# Patient Record
Sex: FEMALE | Race: WHITE | NOT HISPANIC OR LATINO | Employment: OTHER | URBAN - METROPOLITAN AREA
[De-identification: names, ages, dates, MRNs, and addresses within clinical notes are randomized per-mention and may not be internally consistent; named-entity substitution may affect disease eponyms.]

---

## 2023-03-22 DIAGNOSIS — E78.5 HYPERLIPIDEMIA, UNSPECIFIED: ICD-10-CM

## 2023-03-22 DIAGNOSIS — I10 ESSENTIAL (PRIMARY) HYPERTENSION: ICD-10-CM

## 2023-03-22 DIAGNOSIS — E11.9 TYPE 2 DIABETES MELLITUS WITHOUT COMPLICATIONS (MULTI): ICD-10-CM

## 2023-03-22 RX ORDER — METFORMIN HYDROCHLORIDE 500 MG/1
500 TABLET ORAL 2 TIMES DAILY
COMMUNITY
Start: 2015-11-12 | End: 2023-07-11 | Stop reason: ALTCHOICE

## 2023-03-22 RX ORDER — GABAPENTIN 600 MG/1
600 TABLET ORAL 3 TIMES DAILY
COMMUNITY
Start: 2022-06-21 | End: 2023-07-11 | Stop reason: ALTCHOICE

## 2023-03-22 RX ORDER — ATORVASTATIN CALCIUM 40 MG/1
TABLET, FILM COATED ORAL
Qty: 90 TABLET | Refills: 1 | Status: SHIPPED | OUTPATIENT
Start: 2023-03-22 | End: 2023-07-11 | Stop reason: SDUPTHER

## 2023-03-22 RX ORDER — METFORMIN HYDROCHLORIDE 500 MG/1
TABLET ORAL
Qty: 180 TABLET | Refills: 1 | Status: SHIPPED | OUTPATIENT
Start: 2023-03-22 | End: 2023-07-11 | Stop reason: SDUPTHER

## 2023-03-22 RX ORDER — BACLOFEN 10 MG/1
10 TABLET ORAL 3 TIMES DAILY
COMMUNITY
Start: 2022-08-23 | End: 2023-12-13

## 2023-03-22 RX ORDER — LISINOPRIL AND HYDROCHLOROTHIAZIDE 12.5; 2 MG/1; MG/1
TABLET ORAL
Qty: 90 TABLET | Refills: 1 | Status: SHIPPED | OUTPATIENT
Start: 2023-03-22 | End: 2023-07-11 | Stop reason: SDUPTHER

## 2023-03-22 RX ORDER — LAMOTRIGINE 100 MG/1
150 TABLET ORAL 2 TIMES DAILY
COMMUNITY
Start: 2022-05-19 | End: 2023-12-13

## 2023-03-22 RX ORDER — CARBAMAZEPINE 200 MG/1
200 TABLET ORAL 2 TIMES DAILY
COMMUNITY
Start: 2022-05-08 | End: 2023-07-11 | Stop reason: ALTCHOICE

## 2023-03-22 RX ORDER — OXYCODONE AND ACETAMINOPHEN 5; 325 MG/1; MG/1
1 TABLET ORAL EVERY 6 HOURS PRN
COMMUNITY
Start: 2023-02-24 | End: 2023-07-11 | Stop reason: ALTCHOICE

## 2023-03-22 RX ORDER — LISINOPRIL AND HYDROCHLOROTHIAZIDE 10; 12.5 MG/1; MG/1
1 TABLET ORAL DAILY
COMMUNITY
End: 2023-07-11 | Stop reason: ALTCHOICE

## 2023-03-22 RX ORDER — ATORVASTATIN CALCIUM 40 MG/1
40 TABLET, FILM COATED ORAL DAILY
COMMUNITY
Start: 2016-11-04 | End: 2023-07-11 | Stop reason: ALTCHOICE

## 2023-03-22 RX ORDER — ESOMEPRAZOLE MAGNESIUM 40 MG/1
40 CAPSULE, DELAYED RELEASE ORAL
COMMUNITY
End: 2023-07-11 | Stop reason: ALTCHOICE

## 2023-03-22 RX ORDER — DEXAMETHASONE 2 MG/1
2 TABLET ORAL DAILY
COMMUNITY
Start: 2022-08-23 | End: 2023-07-11 | Stop reason: ALTCHOICE

## 2023-07-10 PROBLEM — H10.10 ALLERGIC CONJUNCTIVITIS: Status: ACTIVE | Noted: 2023-07-10

## 2023-07-10 PROBLEM — K81.0 CHOLECYSTITIS, ACUTE: Status: ACTIVE | Noted: 2023-07-10

## 2023-07-10 PROBLEM — J12.82 PNEUMONIA DUE TO CORONAVIRUS DISEASE 2019: Status: ACTIVE | Noted: 2023-01-29

## 2023-07-10 PROBLEM — Z12.39 SCREENING FOR MALIGNANT NEOPLASM OF BREAST: Status: ACTIVE | Noted: 2022-10-13

## 2023-07-10 PROBLEM — H52.4 PRESBYOPIA: Status: ACTIVE | Noted: 2023-07-10

## 2023-07-10 PROBLEM — R09.81 NASAL CONGESTION: Status: ACTIVE | Noted: 2023-07-10

## 2023-07-10 PROBLEM — J06.9 ACUTE UPPER RESPIRATORY INFECTION: Status: ACTIVE | Noted: 2023-07-10

## 2023-07-10 PROBLEM — R00.0 TACHYCARDIA, UNSPECIFIED: Status: ACTIVE | Noted: 2023-07-10

## 2023-07-10 PROBLEM — A41.9 SEPSIS, UNSPECIFIED ORGANISM (MULTI): Status: ACTIVE | Noted: 2022-12-30

## 2023-07-10 PROBLEM — Z86.79 HISTORY OF HYPERTENSION: Status: ACTIVE | Noted: 2023-07-10

## 2023-07-10 PROBLEM — F45.42 PAIN DISORDER ASSOCIATED WITH PSYCHOLOGICAL AND PHYSICAL FACTORS: Status: ACTIVE | Noted: 2023-07-10

## 2023-07-10 PROBLEM — E78.5 DYSLIPIDEMIA: Status: ACTIVE | Noted: 2023-07-10

## 2023-07-10 PROBLEM — R05.9 COUGH: Status: ACTIVE | Noted: 2023-07-10

## 2023-07-10 PROBLEM — Z78.0 POSTMENOPAUSAL STATE: Status: ACTIVE | Noted: 2023-07-10

## 2023-07-10 PROBLEM — H25.10 NUCLEAR SENILE CATARACT: Status: ACTIVE | Noted: 2020-01-20

## 2023-07-10 PROBLEM — N84.0 ENDOMETRIAL POLYP: Status: ACTIVE | Noted: 2023-07-10

## 2023-07-10 PROBLEM — Z20.822 CONTACT WITH AND (SUSPECTED) EXPOSURE TO COVID-19: Status: ACTIVE | Noted: 2023-01-03

## 2023-07-10 PROBLEM — K80.00 CALCULUS OF GALLBLADDER WITH ACUTE CHOLECYSTITIS WITHOUT OBSTRUCTION: Status: ACTIVE | Noted: 2023-07-10

## 2023-07-10 PROBLEM — E66.01 MORBID OBESITY (MULTI): Status: ACTIVE | Noted: 2023-07-10

## 2023-07-10 PROBLEM — G50.9 TRIGEMINAL NERVE DISORDER: Status: ACTIVE | Noted: 2022-11-04

## 2023-07-10 PROBLEM — G50.9 TRIGEMINAL NEUROPATHY: Status: ACTIVE | Noted: 2023-07-10

## 2023-07-10 PROBLEM — J06.9 ACUTE URI: Status: ACTIVE | Noted: 2023-07-10

## 2023-07-10 PROBLEM — Z86.39 HISTORY OF DIABETES MELLITUS: Status: ACTIVE | Noted: 2023-07-10

## 2023-07-10 PROBLEM — D32.9 MENINGIOMA (MULTI): Status: ACTIVE | Noted: 2023-07-10

## 2023-07-10 PROBLEM — N95.0 POSTMENOPAUSAL BLEEDING: Status: ACTIVE | Noted: 2023-07-10

## 2023-07-10 PROBLEM — K82.2 PERFORATION OF GALLBLADDER: Status: ACTIVE | Noted: 2023-01-03

## 2023-07-10 PROBLEM — H52.209 ASTIGMATISM: Status: ACTIVE | Noted: 2023-07-10

## 2023-07-10 PROBLEM — S37.009A INJURY OF KIDNEY: Status: ACTIVE | Noted: 2023-01-03

## 2023-07-10 PROBLEM — A41.9 SEPTICEMIA (MULTI): Status: ACTIVE | Noted: 2023-07-10

## 2023-07-10 PROBLEM — E83.52 HYPERCALCEMIA: Status: ACTIVE | Noted: 2023-07-10

## 2023-07-10 PROBLEM — E86.0 DEHYDRATION: Status: ACTIVE | Noted: 2023-01-03

## 2023-07-10 PROBLEM — K75.0 ABSCESS OF LIVER (HHS-HCC): Status: ACTIVE | Noted: 2023-01-29

## 2023-07-10 PROBLEM — U07.1 PNEUMONIA DUE TO CORONAVIRUS DISEASE 2019: Status: ACTIVE | Noted: 2023-01-29

## 2023-07-10 PROBLEM — M79.2 NEURALGIA: Status: ACTIVE | Noted: 2023-07-10

## 2023-07-10 PROBLEM — G43.909 MIGRAINE HEADACHE: Status: ACTIVE | Noted: 2023-07-10

## 2023-07-10 PROBLEM — J18.9 PNEUMONIA DUE TO INFECTIOUS ORGANISM: Status: ACTIVE | Noted: 2023-07-10

## 2023-07-10 PROBLEM — I10 HYPERTENSION: Status: ACTIVE | Noted: 2023-02-17

## 2023-07-10 PROBLEM — U07.1 POSITIVE SELF-ADMINISTERED ANTIGEN TEST FOR COVID-19: Status: ACTIVE | Noted: 2023-07-10

## 2023-07-10 PROBLEM — L82.1 SEBORRHEIC KERATOSIS: Status: ACTIVE | Noted: 2023-07-10

## 2023-07-10 PROBLEM — N84.0 POLYP OF CORPUS UTERI: Status: ACTIVE | Noted: 2023-07-10

## 2023-07-10 PROBLEM — H10.13 ALLERGIC CONJUNCTIVITIS OF BOTH EYES: Status: ACTIVE | Noted: 2023-07-10

## 2023-07-10 PROBLEM — U07.1 DISEASE DUE TO SEVERE ACUTE RESPIRATORY SYNDROME CORONAVIRUS 2 (SARS-COV-2): Status: ACTIVE | Noted: 2023-01-28

## 2023-07-10 PROBLEM — I25.10 ARTERIOSCLEROSIS OF CORONARY ARTERY: Status: ACTIVE | Noted: 2023-02-14

## 2023-07-10 PROBLEM — G43.909 MIGRAINES: Status: ACTIVE | Noted: 2023-07-10

## 2023-07-10 PROBLEM — R74.8 HIGH LEVEL OF CARDIAC MARKER: Status: ACTIVE | Noted: 2023-07-10

## 2023-07-10 PROBLEM — I95.9 HYPOTENSION: Status: ACTIVE | Noted: 2023-07-10

## 2023-07-10 PROBLEM — Z20.822 SUSPECTED SEVERE ACUTE RESPIRATORY SYNDROME CORONAVIRUS 2 (SARS-COV-2) INFECTION: Status: ACTIVE | Noted: 2023-07-10

## 2023-07-10 PROBLEM — H43.812 PVD (POSTERIOR VITREOUS DETACHMENT), LEFT EYE: Status: ACTIVE | Noted: 2023-07-10

## 2023-07-10 PROBLEM — L57.0 SENILE HYPERKERATOSIS: Status: ACTIVE | Noted: 2023-07-10

## 2023-07-10 PROBLEM — M25.569 KNEE PAIN: Status: ACTIVE | Noted: 2023-07-10

## 2023-07-10 PROBLEM — H02.839 DERMATOCHALASIS: Status: ACTIVE | Noted: 2023-07-10

## 2023-07-10 PROBLEM — G47.33 OBSTRUCTIVE SLEEP APNEA SYNDROME: Status: ACTIVE | Noted: 2023-02-24

## 2023-07-10 PROBLEM — Z86.39 HISTORY OF ELEVATED LIPIDS: Status: ACTIVE | Noted: 2023-07-10
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
## 2023-07-10 PROBLEM — D49.7 NEOPLASM OF MENINGES: Status: ACTIVE | Noted: 2022-09-08

## 2023-07-10 PROBLEM — G50.0 TRIGEMINAL NEURALGIA: Status: ACTIVE | Noted: 2023-07-10

## 2023-07-10 PROBLEM — Z86.16 PERSONAL HISTORY OF COVID-19: Status: ACTIVE | Noted: 2023-02-24

## 2023-07-10 PROBLEM — R51.9 HEADACHE: Status: ACTIVE | Noted: 2023-07-10

## 2023-07-10 PROBLEM — J30.9 ALLERGIC RHINITIS: Status: ACTIVE | Noted: 2023-07-10

## 2023-07-10 PROBLEM — E11.9 CONTROLLED DIABETES MELLITUS TYPE II WITHOUT COMPLICATION (MULTI): Status: ACTIVE | Noted: 2023-02-24

## 2023-07-10 PROBLEM — H25.813 COMBINED FORM OF AGE-RELATED CATARACT, BOTH EYES: Status: ACTIVE | Noted: 2023-07-10

## 2023-07-10 PROBLEM — L71.9 ROSACEA: Status: ACTIVE | Noted: 2023-07-10

## 2023-07-10 PROBLEM — E66.01 MORBID (SEVERE) OBESITY DUE TO EXCESS CALORIES (MULTI): Status: ACTIVE | Noted: 2023-07-10

## 2023-07-10 RX ORDER — ALBUTEROL SULFATE 90 UG/1
AEROSOL, METERED RESPIRATORY (INHALATION)
COMMUNITY
Start: 2014-12-11 | End: 2023-07-11 | Stop reason: ALTCHOICE

## 2023-07-10 RX ORDER — OMEGA-3-ACID ETHYL ESTERS 1 G/1
CAPSULE, LIQUID FILLED ORAL
COMMUNITY
End: 2023-07-11 | Stop reason: ALTCHOICE

## 2023-07-10 RX ORDER — CELECOXIB 200 MG/1
200 CAPSULE ORAL
COMMUNITY
Start: 2012-10-22 | End: 2023-07-11 | Stop reason: ALTCHOICE

## 2023-07-10 RX ORDER — INDOMETHACIN 25 MG/1
CAPSULE ORAL
COMMUNITY
Start: 2019-08-20 | End: 2023-07-11 | Stop reason: ALTCHOICE

## 2023-07-10 RX ORDER — ASPIRIN 81 MG/1
TABLET ORAL
COMMUNITY

## 2023-07-10 RX ORDER — PANTOPRAZOLE SODIUM 40 MG/1
TABLET, DELAYED RELEASE ORAL
COMMUNITY
End: 2023-07-11 | Stop reason: ALTCHOICE

## 2023-07-10 RX ORDER — METRONIDAZOLE 7.5 MG/G
GEL TOPICAL EVERY 12 HOURS
COMMUNITY
Start: 2020-10-12 | End: 2023-07-11 | Stop reason: ALTCHOICE

## 2023-07-11 ENCOUNTER — LAB (OUTPATIENT)
Dept: LAB | Facility: LAB | Age: 78
End: 2023-07-11
Payer: MEDICARE

## 2023-07-11 ENCOUNTER — APPOINTMENT (OUTPATIENT)
Dept: LAB | Facility: LAB | Age: 78
End: 2023-07-11
Payer: MEDICARE

## 2023-07-11 ENCOUNTER — OFFICE VISIT (OUTPATIENT)
Dept: PRIMARY CARE | Facility: CLINIC | Age: 78
End: 2023-07-11
Payer: MEDICARE

## 2023-07-11 VITALS
HEIGHT: 67 IN | TEMPERATURE: 98 F | DIASTOLIC BLOOD PRESSURE: 66 MMHG | OXYGEN SATURATION: 95 % | SYSTOLIC BLOOD PRESSURE: 109 MMHG | WEIGHT: 282 LBS | HEART RATE: 82 BPM | BODY MASS INDEX: 44.26 KG/M2

## 2023-07-11 DIAGNOSIS — E78.5 DYSLIPIDEMIA: ICD-10-CM

## 2023-07-11 DIAGNOSIS — I10 PRIMARY HYPERTENSION: ICD-10-CM

## 2023-07-11 DIAGNOSIS — E11.9 TYPE 2 DIABETES MELLITUS WITHOUT COMPLICATIONS (MULTI): ICD-10-CM

## 2023-07-11 DIAGNOSIS — E11.9 TYPE 2 DIABETES MELLITUS WITHOUT COMPLICATION, WITHOUT LONG-TERM CURRENT USE OF INSULIN (MULTI): ICD-10-CM

## 2023-07-11 DIAGNOSIS — Z12.31 ENCOUNTER FOR SCREENING MAMMOGRAM FOR MALIGNANT NEOPLASM OF BREAST: ICD-10-CM

## 2023-07-11 DIAGNOSIS — Z00.00 MEDICARE ANNUAL WELLNESS VISIT, SUBSEQUENT: Primary | ICD-10-CM

## 2023-07-11 DIAGNOSIS — I10 ESSENTIAL (PRIMARY) HYPERTENSION: ICD-10-CM

## 2023-07-11 DIAGNOSIS — Z12.83 SKIN CANCER SCREENING: ICD-10-CM

## 2023-07-11 DIAGNOSIS — Z12.11 COLON CANCER SCREENING: ICD-10-CM

## 2023-07-11 DIAGNOSIS — E78.5 HYPERLIPIDEMIA, UNSPECIFIED: ICD-10-CM

## 2023-07-11 PROBLEM — H10.10 ALLERGIC CONJUNCTIVITIS: Status: RESOLVED | Noted: 2023-07-10 | Resolved: 2023-07-11

## 2023-07-11 PROBLEM — L57.0 SENILE HYPERKERATOSIS: Status: RESOLVED | Noted: 2023-07-10 | Resolved: 2023-07-11

## 2023-07-11 PROBLEM — Z20.822 SUSPECTED SEVERE ACUTE RESPIRATORY SYNDROME CORONAVIRUS 2 (SARS-COV-2) INFECTION: Status: RESOLVED | Noted: 2023-07-10 | Resolved: 2023-07-11

## 2023-07-11 PROBLEM — K82.2 PERFORATION OF GALLBLADDER: Status: RESOLVED | Noted: 2023-01-03 | Resolved: 2023-07-11

## 2023-07-11 PROBLEM — G50.9 TRIGEMINAL NEUROPATHY: Status: RESOLVED | Noted: 2023-07-10 | Resolved: 2023-07-11

## 2023-07-11 PROBLEM — U07.1 DISEASE DUE TO SEVERE ACUTE RESPIRATORY SYNDROME CORONAVIRUS 2 (SARS-COV-2): Status: RESOLVED | Noted: 2023-01-28 | Resolved: 2023-07-11

## 2023-07-11 PROBLEM — Z86.79 HISTORY OF HYPERTENSION: Status: RESOLVED | Noted: 2023-07-10 | Resolved: 2023-07-11

## 2023-07-11 PROBLEM — R09.81 NASAL CONGESTION: Status: RESOLVED | Noted: 2023-07-10 | Resolved: 2023-07-11

## 2023-07-11 PROBLEM — Z86.39 HISTORY OF DIABETES MELLITUS: Status: RESOLVED | Noted: 2023-07-10 | Resolved: 2023-07-11

## 2023-07-11 PROBLEM — J06.9 ACUTE URI: Status: RESOLVED | Noted: 2023-07-10 | Resolved: 2023-07-11

## 2023-07-11 PROBLEM — N84.0 POLYP OF CORPUS UTERI: Status: RESOLVED | Noted: 2023-07-10 | Resolved: 2023-07-11

## 2023-07-11 PROBLEM — Z86.39 HISTORY OF ELEVATED LIPIDS: Status: RESOLVED | Noted: 2023-07-10 | Resolved: 2023-07-11

## 2023-07-11 PROBLEM — G50.9 TRIGEMINAL NERVE DISORDER: Status: RESOLVED | Noted: 2022-11-04 | Resolved: 2023-07-11

## 2023-07-11 PROBLEM — A41.9 SEPSIS, UNSPECIFIED ORGANISM (MULTI): Status: RESOLVED | Noted: 2022-12-30 | Resolved: 2023-07-11

## 2023-07-11 PROBLEM — J30.9 ALLERGIC RHINITIS: Status: RESOLVED | Noted: 2023-07-10 | Resolved: 2023-07-11

## 2023-07-11 PROBLEM — R51.9 HEADACHE: Status: RESOLVED | Noted: 2023-07-10 | Resolved: 2023-07-11

## 2023-07-11 PROBLEM — A41.9 SEPTICEMIA (MULTI): Status: RESOLVED | Noted: 2023-07-10 | Resolved: 2023-07-11

## 2023-07-11 PROBLEM — J18.9 PNEUMONIA DUE TO INFECTIOUS ORGANISM: Status: RESOLVED | Noted: 2023-07-10 | Resolved: 2023-07-11

## 2023-07-11 PROBLEM — J12.82 PNEUMONIA DUE TO CORONAVIRUS DISEASE 2019: Status: RESOLVED | Noted: 2023-01-29 | Resolved: 2023-07-11

## 2023-07-11 PROBLEM — U07.1 POSITIVE SELF-ADMINISTERED ANTIGEN TEST FOR COVID-19: Status: RESOLVED | Noted: 2023-07-10 | Resolved: 2023-07-11

## 2023-07-11 PROBLEM — Z78.0 POSTMENOPAUSAL STATE: Status: RESOLVED | Noted: 2023-07-10 | Resolved: 2023-07-11

## 2023-07-11 PROBLEM — Z12.39 SCREENING FOR MALIGNANT NEOPLASM OF BREAST: Status: RESOLVED | Noted: 2022-10-13 | Resolved: 2023-07-11

## 2023-07-11 PROBLEM — J06.9 ACUTE UPPER RESPIRATORY INFECTION: Status: RESOLVED | Noted: 2023-07-10 | Resolved: 2023-07-11

## 2023-07-11 PROBLEM — Z20.822 CONTACT WITH AND (SUSPECTED) EXPOSURE TO COVID-19: Status: RESOLVED | Noted: 2023-01-03 | Resolved: 2023-07-11

## 2023-07-11 PROBLEM — H10.13 ALLERGIC CONJUNCTIVITIS OF BOTH EYES: Status: RESOLVED | Noted: 2023-07-10 | Resolved: 2023-07-11

## 2023-07-11 PROBLEM — U07.1 PNEUMONIA DUE TO CORONAVIRUS DISEASE 2019: Status: RESOLVED | Noted: 2023-01-29 | Resolved: 2023-07-11

## 2023-07-11 PROBLEM — K81.0 CHOLECYSTITIS, ACUTE: Status: RESOLVED | Noted: 2023-07-10 | Resolved: 2023-07-11

## 2023-07-11 PROBLEM — R00.0 TACHYCARDIA, UNSPECIFIED: Status: RESOLVED | Noted: 2023-07-10 | Resolved: 2023-07-11

## 2023-07-11 PROBLEM — M25.569 KNEE PAIN: Status: RESOLVED | Noted: 2023-07-10 | Resolved: 2023-07-11

## 2023-07-11 PROBLEM — F45.42 PAIN DISORDER ASSOCIATED WITH PSYCHOLOGICAL AND PHYSICAL FACTORS: Status: RESOLVED | Noted: 2023-07-10 | Resolved: 2023-07-11

## 2023-07-11 LAB
ALANINE AMINOTRANSFERASE (SGPT) (U/L) IN SER/PLAS: 13 U/L (ref 7–45)
ALBUMIN (G/DL) IN SER/PLAS: 4.6 G/DL (ref 3.4–5)
ALKALINE PHOSPHATASE (U/L) IN SER/PLAS: 98 U/L (ref 33–136)
ANION GAP IN SER/PLAS: 14 MMOL/L (ref 10–20)
ASPARTATE AMINOTRANSFERASE (SGOT) (U/L) IN SER/PLAS: 15 U/L (ref 9–39)
BILIRUBIN TOTAL (MG/DL) IN SER/PLAS: 0.5 MG/DL (ref 0–1.2)
CALCIUM (MG/DL) IN SER/PLAS: 10.8 MG/DL (ref 8.6–10.6)
CARBON DIOXIDE, TOTAL (MMOL/L) IN SER/PLAS: 30 MMOL/L (ref 21–32)
CHLORIDE (MMOL/L) IN SER/PLAS: 99 MMOL/L (ref 98–107)
CHOLESTEROL (MG/DL) IN SER/PLAS: 153 MG/DL (ref 0–199)
CHOLESTEROL IN HDL (MG/DL) IN SER/PLAS: 61.4 MG/DL
CHOLESTEROL/HDL RATIO: 2.5
CREATININE (MG/DL) IN SER/PLAS: 0.74 MG/DL (ref 0.5–1.05)
ERYTHROCYTE DISTRIBUTION WIDTH (RATIO) BY AUTOMATED COUNT: 14.5 % (ref 11.5–14.5)
ERYTHROCYTE MEAN CORPUSCULAR HEMOGLOBIN CONCENTRATION (G/DL) BY AUTOMATED: 31.1 G/DL (ref 32–36)
ERYTHROCYTE MEAN CORPUSCULAR VOLUME (FL) BY AUTOMATED COUNT: 92 FL (ref 80–100)
ERYTHROCYTES (10*6/UL) IN BLOOD BY AUTOMATED COUNT: 4.23 X10E12/L (ref 4–5.2)
ESTIMATED AVERAGE GLUCOSE FOR HBA1C: 140 MG/DL
GFR FEMALE: 84 ML/MIN/1.73M2
GLUCOSE (MG/DL) IN SER/PLAS: 108 MG/DL (ref 74–99)
HEMATOCRIT (%) IN BLOOD BY AUTOMATED COUNT: 38.9 % (ref 36–46)
HEMOGLOBIN (G/DL) IN BLOOD: 12.1 G/DL (ref 12–16)
HEMOGLOBIN A1C/HEMOGLOBIN TOTAL IN BLOOD: 6.5 %
LDL: 74 MG/DL (ref 0–99)
LEUKOCYTES (10*3/UL) IN BLOOD BY AUTOMATED COUNT: 4.6 X10E9/L (ref 4.4–11.3)
NRBC (PER 100 WBCS) BY AUTOMATED COUNT: 0 /100 WBC (ref 0–0)
PLATELETS (10*3/UL) IN BLOOD AUTOMATED COUNT: 301 X10E9/L (ref 150–450)
POTASSIUM (MMOL/L) IN SER/PLAS: 4.7 MMOL/L (ref 3.5–5.3)
PROTEIN TOTAL: 6.8 G/DL (ref 6.4–8.2)
SODIUM (MMOL/L) IN SER/PLAS: 138 MMOL/L (ref 136–145)
TRIGLYCERIDE (MG/DL) IN SER/PLAS: 88 MG/DL (ref 0–149)
UREA NITROGEN (MG/DL) IN SER/PLAS: 20 MG/DL (ref 6–23)
VLDL: 18 MG/DL (ref 0–40)

## 2023-07-11 PROCEDURE — 1159F MED LIST DOCD IN RCRD: CPT | Performed by: INTERNAL MEDICINE

## 2023-07-11 PROCEDURE — 80053 COMPREHEN METABOLIC PANEL: CPT

## 2023-07-11 PROCEDURE — 1170F FXNL STATUS ASSESSED: CPT | Performed by: INTERNAL MEDICINE

## 2023-07-11 PROCEDURE — 3078F DIAST BP <80 MM HG: CPT | Performed by: INTERNAL MEDICINE

## 2023-07-11 PROCEDURE — 83036 HEMOGLOBIN GLYCOSYLATED A1C: CPT

## 2023-07-11 PROCEDURE — 3074F SYST BP LT 130 MM HG: CPT | Performed by: INTERNAL MEDICINE

## 2023-07-11 PROCEDURE — 80061 LIPID PANEL: CPT

## 2023-07-11 PROCEDURE — 85027 COMPLETE CBC AUTOMATED: CPT

## 2023-07-11 PROCEDURE — 1036F TOBACCO NON-USER: CPT | Performed by: INTERNAL MEDICINE

## 2023-07-11 PROCEDURE — 36415 COLL VENOUS BLD VENIPUNCTURE: CPT

## 2023-07-11 PROCEDURE — 1126F AMNT PAIN NOTED NONE PRSNT: CPT | Performed by: INTERNAL MEDICINE

## 2023-07-11 PROCEDURE — G0439 PPPS, SUBSEQ VISIT: HCPCS | Performed by: INTERNAL MEDICINE

## 2023-07-11 RX ORDER — LISINOPRIL AND HYDROCHLOROTHIAZIDE 12.5; 2 MG/1; MG/1
1 TABLET ORAL DAILY
Qty: 90 TABLET | Refills: 1 | Status: SHIPPED | OUTPATIENT
Start: 2023-07-11 | End: 2024-01-22 | Stop reason: ALTCHOICE

## 2023-07-11 RX ORDER — GABAPENTIN 600 MG/1
600 TABLET ORAL 3 TIMES DAILY
COMMUNITY
End: 2023-11-09 | Stop reason: SDUPTHER

## 2023-07-11 RX ORDER — ATORVASTATIN CALCIUM 40 MG/1
40 TABLET, FILM COATED ORAL DAILY
Qty: 90 TABLET | Refills: 1 | Status: SHIPPED | OUTPATIENT
Start: 2023-07-11 | End: 2024-02-02

## 2023-07-11 RX ORDER — GABAPENTIN 300 MG/1
CAPSULE ORAL
COMMUNITY
Start: 2023-07-07 | End: 2023-07-11 | Stop reason: ALTCHOICE

## 2023-07-11 RX ORDER — METFORMIN HYDROCHLORIDE 500 MG/1
500 TABLET ORAL 2 TIMES DAILY
Qty: 180 TABLET | Refills: 1 | Status: SHIPPED | OUTPATIENT
Start: 2023-07-11 | End: 2024-02-02

## 2023-07-11 ASSESSMENT — PATIENT HEALTH QUESTIONNAIRE - PHQ9
2. FEELING DOWN, DEPRESSED OR HOPELESS: NOT AT ALL
1. LITTLE INTEREST OR PLEASURE IN DOING THINGS: NOT AT ALL
SUM OF ALL RESPONSES TO PHQ9 QUESTIONS 1 AND 2: 0

## 2023-07-11 ASSESSMENT — ACTIVITIES OF DAILY LIVING (ADL)
BATHING: INDEPENDENT
DRESSING: INDEPENDENT
DOING_HOUSEWORK: INDEPENDENT
GROCERY_SHOPPING: INDEPENDENT
MANAGING_FINANCES: INDEPENDENT
TAKING_MEDICATION: INDEPENDENT

## 2023-07-11 NOTE — PROGRESS NOTES
"SUBJECTIVE:   Amy Ruby is a 78 y.o. female presenting for her annual physical/wellness.  PCP: Christina Souza MD  Medicare wellness and routine Follow up.   She finally recovered from her ruptured cholecystitis, and surgeries. Was not able to do much physical activities, gained some weight. Is back to doing garden work.   No other concerns.    Colon screening:  due. Last cologuard box got flooded.  Last pap: na  Last mammogram: due in 10/2023   Vaccines Up to date.    Diet:   healthy in general  Exercises:  mild exercises regularly  Lab Results   Component Value Date    HGBA1C 6.7 (A) 09/07/2022     Lab Results   Component Value Date    CREATININE 0.97 01/27/2023     Lab Results   Component Value Date    WBC 5.7 01/27/2023    HGB 12.0 01/27/2023    HCT 38.5 01/27/2023    MCV 90 01/27/2023     01/27/2023     Lab Results   Component Value Date    CHOL 192 09/07/2022    CHOL 195 04/27/2021    CHOL 162 03/04/2020     Lab Results   Component Value Date    HDL 69.6 09/07/2022    HDL 60.2 04/27/2021    HDL 53.3 03/04/2020     No results found for: \"LDLCALC\"  Lab Results   Component Value Date    TRIG 125 09/07/2022    TRIG 176 (H) 04/27/2021    TRIG 166 (H) 03/04/2020     No components found for: \"CHOLHDL\"       ROS:   Feeling well. No dyspnea or chest pain on exertion. No abdominal pain, change in bowel habits, black or bloody stools. No urinary tract or  symptoms.  No breast concerns. No neurological complaints.    OBJECTIVE:   The patient appears well, alert, oriented x 3, in no distress.   /66   Pulse 82   Temp 36.7 °C (98 °F)   Ht 1.689 m (5' 6.5\")   Wt 128 kg (282 lb)   SpO2 95%   BMI 44.83 kg/m²   ENT normal.  Neck supple. No adenopathy or thyromegaly. MARIO ALBERTO. Lungs are clear, good air entry, no wheezes, rhonchi or rales. S1 and S2 normal, no murmurs, regular rate and rhythm. Abdomen is soft without tenderness, guarding, mass or organomegaly.  exam deferred to Gyn. Extremities show mild " ankle edema, normal peripheral pulses. Neurological is normal without focal findings.    A/P:  1. Medicare annual wellness visit, subsequent       2. Dyslipidemia       3. Primary hypertension     - CBC; Future  - Comprehensive Metabolic Panel; Future  - Lipid Panel; Future    4. Colon cancer screening     - Cologuard® colon cancer screening; Future  - Cologuard® colon cancer screening    5. Encounter for screening mammogram for malignant neoplasm of breast     - BI mammo bilateral screening tomosynthesis; Future    6. Type 2 diabetes mellitus without complication, without long-term current use of insulin (CMS/HCC)     - Comprehensive Metabolic Panel; Future  - Lipid Panel; Future  - Hemoglobin A1C; Future    7. Skin cancer screening     - Referral to Dermatology; Future    8. Type 2 diabetes mellitus without complications (CMS/HCC)      - metFORMIN (Glucophage) 500 mg tablet; Take 1 tablet (500 mg) by mouth 2 times a day.  Dispense: 180 tablet; Refill: 1    9. Essential (primary) hypertension     - lisinopriL-hydrochlorothiazide 20-12.5 mg tablet; Take 1 tablet by mouth once daily.  Dispense: 90 tablet; Refill: 1    10. Hyperlipidemia, unspecified     - atorvastatin (Lipitor) 40 mg tablet; Take 1 tablet (40 mg) by mouth once daily.  Dispense: 90 tablet; Refill: 1    PLAN:   continue current medications    Follow up: 6 months

## 2023-07-12 DIAGNOSIS — E83.52 HYPERCALCEMIA: Primary | ICD-10-CM

## 2023-08-02 ENCOUNTER — LAB (OUTPATIENT)
Dept: LAB | Facility: LAB | Age: 78
End: 2023-08-02
Payer: MEDICARE

## 2023-08-02 DIAGNOSIS — E83.52 HYPERCALCEMIA: ICD-10-CM

## 2023-08-02 LAB
CALCIDIOL (25 OH VITAMIN D3) (NG/ML) IN SER/PLAS: 12 NG/ML
CALCIUM (MG/DL) IN SER/PLAS: 10.9 MG/DL (ref 8.6–10.6)

## 2023-08-02 PROCEDURE — 82306 VITAMIN D 25 HYDROXY: CPT

## 2023-08-02 PROCEDURE — 36415 COLL VENOUS BLD VENIPUNCTURE: CPT

## 2023-08-02 PROCEDURE — 82310 ASSAY OF CALCIUM: CPT

## 2023-08-30 PROBLEM — I95.9 HYPOTENSION: Status: ACTIVE | Noted: 2023-08-30

## 2023-08-30 PROBLEM — E66.9 OBESITY, UNSPECIFIED: Status: ACTIVE | Noted: 2023-01-03

## 2023-08-30 PROBLEM — M62.82 RHABDOMYOLYSIS: Status: ACTIVE | Noted: 2023-01-03

## 2023-08-30 PROBLEM — Z79.899 OTHER LONG TERM (CURRENT) DRUG THERAPY: Status: ACTIVE | Noted: 2023-01-29

## 2023-08-30 PROBLEM — Z90.49 ACQUIRED ABSENCE OF OTHER SPECIFIED PARTS OF DIGESTIVE TRACT: Status: ACTIVE | Noted: 2023-02-03

## 2023-08-30 PROBLEM — E78.5 HYPERLIPIDEMIA, UNSPECIFIED: Status: ACTIVE | Noted: 2023-01-03

## 2023-08-30 PROBLEM — Z79.84 LONG TERM (CURRENT) USE OF ORAL HYPOGLYCEMIC DRUGS: Status: ACTIVE | Noted: 2023-01-29

## 2023-08-30 PROBLEM — M62.81 MUSCLE WEAKNESS (GENERALIZED): Status: ACTIVE | Noted: 2023-01-03

## 2023-08-30 PROBLEM — I10 ESSENTIAL (PRIMARY) HYPERTENSION: Status: ACTIVE | Noted: 2023-01-03

## 2023-08-30 PROBLEM — E11.9 TYPE 2 DIABETES MELLITUS WITHOUT COMPLICATION (MULTI): Status: ACTIVE | Noted: 2023-01-03

## 2023-08-30 PROBLEM — K21.9 GASTRO-ESOPHAGEAL REFLUX DISEASE WITHOUT ESOPHAGITIS: Status: ACTIVE | Noted: 2023-01-03

## 2023-08-30 PROBLEM — R19.5 OTHER FECAL ABNORMALITIES: Status: ACTIVE | Noted: 2023-02-03

## 2023-08-30 PROBLEM — N17.9 ACUTE KIDNEY FAILURE, UNSPECIFIED (CMS-HCC): Status: ACTIVE | Noted: 2023-01-03

## 2023-08-30 PROBLEM — Z86.011 PERSONAL HISTORY OF BENIGN NEOPLASM OF THE BRAIN: Status: ACTIVE | Noted: 2023-01-03
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
## 2023-08-30 RX ORDER — GABAPENTIN 300 MG/1
3 CAPSULE ORAL 3 TIMES DAILY
COMMUNITY
Start: 2023-07-07 | End: 2023-10-12

## 2023-10-06 ENCOUNTER — OFFICE VISIT (OUTPATIENT)
Dept: NEUROSURGERY | Facility: CLINIC | Age: 78
End: 2023-10-06
Payer: MEDICARE

## 2023-10-06 VITALS
WEIGHT: 182 LBS | SYSTOLIC BLOOD PRESSURE: 143 MMHG | HEIGHT: 67 IN | HEART RATE: 98 BPM | TEMPERATURE: 96.4 F | BODY MASS INDEX: 28.56 KG/M2 | DIASTOLIC BLOOD PRESSURE: 78 MMHG

## 2023-10-06 DIAGNOSIS — G50.9 TRIGEMINAL NEUROPATHY: Primary | ICD-10-CM

## 2023-10-06 DIAGNOSIS — D32.9 MENINGIOMA (MULTI): ICD-10-CM

## 2023-10-06 PROCEDURE — 1036F TOBACCO NON-USER: CPT | Performed by: NEUROLOGICAL SURGERY

## 2023-10-06 PROCEDURE — 1125F AMNT PAIN NOTED PAIN PRSNT: CPT | Performed by: NEUROLOGICAL SURGERY

## 2023-10-06 PROCEDURE — 99213 OFFICE O/P EST LOW 20 MIN: CPT | Performed by: NEUROLOGICAL SURGERY

## 2023-10-06 PROCEDURE — 3077F SYST BP >= 140 MM HG: CPT | Performed by: NEUROLOGICAL SURGERY

## 2023-10-06 PROCEDURE — 1159F MED LIST DOCD IN RCRD: CPT | Performed by: NEUROLOGICAL SURGERY

## 2023-10-06 PROCEDURE — 3078F DIAST BP <80 MM HG: CPT | Performed by: NEUROLOGICAL SURGERY

## 2023-10-06 ASSESSMENT — PAIN SCALES - GENERAL: PAINLEVEL: 2

## 2023-10-06 NOTE — PROGRESS NOTES
NEUROSURGERY EVALUATION NOTE    Diagnosis  1. Trigeminal neuropathy        2. Meningioma (CMS/HCC)            Patient Discussion/Summary  1) Today we discussed continuing with the medications and conservative management as tolerated.   2) Ms. Ruby can follow-up with me after the holidays and we can reassess her pain at that time.   3) If Ms. Ruby's symptoms worsen and she would like us to give her a referral to Dr. Serna from gamma knife radiosurgery, then she can call our office to request that at any time.     Provider Impression  78 y.o. female with L TN meningioma in [place] producing L trigeminal neuropathic pain, V3> V2 >>V1, but no loss of sensation, poss dysesthesia; pt on numerous neuropathic agents, managed by Dr. De León with some relief; she is s/p GKS with Dr. Littlejohn in 2020 to [place] with 3-4 mo resolution of sx. Pain is triggerable like TN, but also with a baseline constant component. During last OV on 7/7/23 we again discussed surgical options of repeat GKS to tumor, vs GKS or RF to TN (to treat as though trigeminal neuralgia given sx are similar to trigeminal neuralgia) vs treating for trigeminal neuropathic pain via R MCS (less optimal bc unable to get MRI). Currently, pt is uninterested in revisiting any of the above bc she feels her sx are manageable. She plans to continue playing with medications with Dr. De León as needed and would like to see me again after the holidays to revisit her sx in case her pain changes. We discussed getting her a referral to Dr. Serna from radiation oncology while she is doing well, but she wishes to hold off on that as well.     History of Present Illness  Chief Complaint:   Chief Complaint   Patient presents with    Follow-up     Left side facial pain       HPI: Amy Ruby is a 78 y.o. female with L [place] meningioma in [place] producing L trigeminal neuropathic pain, V3> V2 >>V1, but no loss of sensation, poss dysesthesia; pt on numerous  neuropathic agents with some relief and s/p GKS with Dr. Littlejohn in 2020 to [place] [facility] with 3-4 mo resolution of sx. Pain is triggerable like TN, but also with a baseline constant component. During last OV on 7/7/23 we again discussed surgical options of repeat GKS to tumor, vs GKS or RF to TN (to treat as though trigeminal neuralgia given sx are similar to trigeminal neuralgia) vs treating for trigeminal neuropathic pain via R MCS (less optimal bc unable to get MRI). However, repeat GKS to tumor is suboptimal given no tumor growth on last imaging and proximity to cranial nerves; GKS to cisternal segment of nerve is also less desirable given risk of producing deafferentation pain since this is not true TN. As such, we also discussed MCS on the right as a surgical option to help control the pain, and we would try to follow tumor with CT subsequently.     Patient reports to be doing okay since the last visit, but her left facial pain has continued. Patient reports that previously when she took her medications she would have pain free intervals, but now has consistent pain, however, she feels it is manageable and is still not interested in proceeding with surgery or GKS at this time. Patient reports occas sensation of hyperesthesia right around the bottom of the eye and around the nose, which happens several times a day. No numbness. Patient denies new pains. In the interim, patient completed a repeat brain MRI, which reported stable meningioma. Patient has also continued to follow with Dr. De León and had her lamictal dosing increased, which she is currently titrating up. Patient reports that if this is not helpful, they will consider other medications. Patient continues to take a combination of gabapentin, lamictal, and baclofen, with suboptimal relief of L facial pain. Patient presents today for follow up and to further discuss surgical options.     ROS: A complete 11 system ROS was performed (constitutional,  eyes, ENT, cardiovascular, respiratory, GI, , musculoskeletal, skin, neurological, and psychiatric) and was negative aside from the pertinent positives and negatives noted in the HPI.    Previous History  Past Medical History:   Diagnosis Date    Age-related nuclear cataract, left eye 01/20/2020    Age-related nuclear cataract, left    Age-related nuclear cataract, right eye 01/20/2020    Age-related nuclear cataract, right    Cholecystitis, acute 07/10/2023    Cortical age-related cataract, left eye 01/20/2020    Cortical age-related cataract of left eye    Cortical age-related cataract, right eye 01/20/2020    Cortical age-related cataract of right eye    Encounter for general adult medical examination without abnormal findings 09/07/2022    Medicare annual wellness visit, subsequent    Pain in right knee 11/12/2015    Knee pain, bilateral    Perforation of gallbladder 01/03/2023    Personal history of other diseases of the circulatory system     History of essential hypertension    Personal history of other diseases of the circulatory system 03/06/2017    History of abnormal electrocardiography    Personal history of other diseases of the respiratory system     History of bronchitis    Personal history of other endocrine, nutritional and metabolic disease     History of hyperlipidemia    Personal history of other endocrine, nutritional and metabolic disease     History of diabetes mellitus     Past Surgical History:   Procedure Laterality Date    CHOLECYSTECTOMY      KNEE SURGERY  02/16/2017    Knee Surgery Right    MR HEAD ANGIO WO IV CONTRAST  03/07/2019    MR HEAD ANGIO WO IV CONTRAST [place] [facility]    MR NECK ANGIO WO IV CONTRAST  03/07/2019    MR NECK ANGIO WO IV CONTRAST [place] [facility]    OTHER SURGICAL HISTORY  03/06/2017    Arterial Catheterization    OTHER SURGICAL HISTORY  05/08/2017    Hysteroscopy    TONSILLECTOMY  02/16/2017    Tonsillectomy     Social History      Tobacco Use    Smoking status: Never    Smokeless tobacco: Never   Substance Use Topics    Alcohol use: Never    Drug use: Never     Family History   Problem Relation Name Age of Onset    Dementia Mother          Alzheimer's    Hypertension Mother      Other (Cardiac Disorder) Mother      Seizures Father      Diabetes Father      Prostate cancer Father      Hypertension Sister      Diabetes Sister      Hypertension Brother      Diabetes Brother       Allergies   Allergen Reactions    Other Other     Current Outpatient Medications   Medication Instructions    aspirin 81 mg EC tablet oral, Daily RT    atorvastatin (LIPITOR) 40 mg, oral, Daily    baclofen (LIORESAL) 10 mg, oral, 3 times daily    gabapentin (Neurontin) 300 mg capsule 3 capsules, oral, 3 times daily, TITRATE AS DIRECTED.    gabapentin (NEURONTIN) 600 mg, oral, 3 times daily    lamoTRIgine (LAMICTAL) 150 mg, oral, 2 times daily    lisinopriL-hydrochlorothiazide 20-12.5 mg tablet 1 tablet, oral, Daily    metFORMIN (GLUCOPHAGE) 500 mg, oral, 2 times daily       Physical Exam  Well appearing, no acute distress. Cranial nerves 2-12 intact. No dysethesias of face today. Strengths equal throughout.     Results      Time  Prep Time On Date of the Patient Encounter:    Minutes  Time Directly with Patient/Family/Caregiver:    Minutes  Additional Time Spent on Patient Care Activities:   Minutes  Documentation Time:      Minutes  Other Time Spent:      Minutes    Details of Other Time Spent:      Total Time on Date of Patient Encounter:    Minutes

## 2023-10-11 DIAGNOSIS — G50.9 DISORDER OF TRIGEMINAL NERVE, UNSPECIFIED: ICD-10-CM

## 2023-10-12 RX ORDER — GABAPENTIN 300 MG/1
300 CAPSULE ORAL 3 TIMES DAILY
Qty: 270 CAPSULE | Refills: 3 | Status: SHIPPED | OUTPATIENT
Start: 2023-10-12

## 2023-11-09 DIAGNOSIS — M79.2 NEURALGIA: Primary | ICD-10-CM

## 2023-11-09 RX ORDER — GABAPENTIN 600 MG/1
600 TABLET ORAL 3 TIMES DAILY
Qty: 270 TABLET | Refills: 3 | Status: SHIPPED | OUTPATIENT
Start: 2023-11-09

## 2023-12-13 DIAGNOSIS — M79.2 NEURALGIA AND NEURITIS, UNSPECIFIED: ICD-10-CM

## 2023-12-13 DIAGNOSIS — G50.0 TRIGEMINAL NEURALGIA: ICD-10-CM

## 2023-12-13 RX ORDER — LAMOTRIGINE 100 MG/1
100 TABLET ORAL 2 TIMES DAILY
Qty: 180 TABLET | Refills: 3 | Status: SHIPPED | OUTPATIENT
Start: 2023-12-13

## 2023-12-13 RX ORDER — BACLOFEN 10 MG/1
10 TABLET ORAL 3 TIMES DAILY
Qty: 270 TABLET | Refills: 3 | Status: SHIPPED | OUTPATIENT
Start: 2023-12-13

## 2024-01-11 ENCOUNTER — OFFICE VISIT (OUTPATIENT)
Dept: PRIMARY CARE | Facility: CLINIC | Age: 79
End: 2024-01-11
Payer: MEDICARE

## 2024-01-11 ENCOUNTER — LAB (OUTPATIENT)
Dept: LAB | Facility: LAB | Age: 79
End: 2024-01-11
Payer: MEDICARE

## 2024-01-11 VITALS
BODY MASS INDEX: 45.67 KG/M2 | OXYGEN SATURATION: 95 % | HEART RATE: 88 BPM | HEIGHT: 67 IN | SYSTOLIC BLOOD PRESSURE: 120 MMHG | DIASTOLIC BLOOD PRESSURE: 72 MMHG | WEIGHT: 291 LBS | TEMPERATURE: 98 F

## 2024-01-11 DIAGNOSIS — I25.10 ARTERIOSCLEROSIS OF CORONARY ARTERY: ICD-10-CM

## 2024-01-11 DIAGNOSIS — E11.9 TYPE 2 DIABETES MELLITUS WITHOUT COMPLICATION, WITHOUT LONG-TERM CURRENT USE OF INSULIN (MULTI): Primary | ICD-10-CM

## 2024-01-11 DIAGNOSIS — K21.9 GASTRO-ESOPHAGEAL REFLUX DISEASE WITHOUT ESOPHAGITIS: ICD-10-CM

## 2024-01-11 DIAGNOSIS — E78.5 DYSLIPIDEMIA: ICD-10-CM

## 2024-01-11 DIAGNOSIS — E66.01 MORBID (SEVERE) OBESITY DUE TO EXCESS CALORIES (MULTI): ICD-10-CM

## 2024-01-11 DIAGNOSIS — I10 PRIMARY HYPERTENSION: ICD-10-CM

## 2024-01-11 DIAGNOSIS — I10 ESSENTIAL (PRIMARY) HYPERTENSION: ICD-10-CM

## 2024-01-11 DIAGNOSIS — D32.9 MENINGIOMA (MULTI): ICD-10-CM

## 2024-01-11 DIAGNOSIS — Z78.0 MENOPAUSE: ICD-10-CM

## 2024-01-11 DIAGNOSIS — E78.5 HYPERLIPIDEMIA, UNSPECIFIED HYPERLIPIDEMIA TYPE: ICD-10-CM

## 2024-01-11 DIAGNOSIS — E11.9 TYPE 2 DIABETES MELLITUS WITHOUT COMPLICATION, WITHOUT LONG-TERM CURRENT USE OF INSULIN (MULTI): ICD-10-CM

## 2024-01-11 LAB
ANION GAP SERPL CALC-SCNC: 14 MMOL/L (ref 10–20)
BUN SERPL-MCNC: 18 MG/DL (ref 6–23)
CALCIUM SERPL-MCNC: 11.1 MG/DL (ref 8.6–10.6)
CHLORIDE SERPL-SCNC: 98 MMOL/L (ref 98–107)
CO2 SERPL-SCNC: 30 MMOL/L (ref 21–32)
CREAT SERPL-MCNC: 0.88 MG/DL (ref 0.5–1.05)
CREAT UR-MCNC: 92.5 MG/DL (ref 20–320)
EGFRCR SERPLBLD CKD-EPI 2021: 68 ML/MIN/1.73M*2
GLUCOSE SERPL-MCNC: 138 MG/DL (ref 74–99)
MICROALBUMIN UR-MCNC: <7 MG/L
MICROALBUMIN/CREAT UR: NORMAL MG/G{CREAT}
POTASSIUM SERPL-SCNC: 4.4 MMOL/L (ref 3.5–5.3)
SODIUM SERPL-SCNC: 138 MMOL/L (ref 136–145)

## 2024-01-11 PROCEDURE — 3078F DIAST BP <80 MM HG: CPT | Performed by: INTERNAL MEDICINE

## 2024-01-11 PROCEDURE — 1036F TOBACCO NON-USER: CPT | Performed by: INTERNAL MEDICINE

## 2024-01-11 PROCEDURE — 1159F MED LIST DOCD IN RCRD: CPT | Performed by: INTERNAL MEDICINE

## 2024-01-11 PROCEDURE — 82043 UR ALBUMIN QUANTITATIVE: CPT

## 2024-01-11 PROCEDURE — 80048 BASIC METABOLIC PNL TOTAL CA: CPT

## 2024-01-11 PROCEDURE — 3074F SYST BP LT 130 MM HG: CPT | Performed by: INTERNAL MEDICINE

## 2024-01-11 PROCEDURE — 83036 HEMOGLOBIN GLYCOSYLATED A1C: CPT

## 2024-01-11 PROCEDURE — 99214 OFFICE O/P EST MOD 30 MIN: CPT | Performed by: INTERNAL MEDICINE

## 2024-01-11 PROCEDURE — 36415 COLL VENOUS BLD VENIPUNCTURE: CPT

## 2024-01-11 PROCEDURE — 1125F AMNT PAIN NOTED PAIN PRSNT: CPT | Performed by: INTERNAL MEDICINE

## 2024-01-11 PROCEDURE — 82570 ASSAY OF URINE CREATININE: CPT

## 2024-01-11 NOTE — PROGRESS NOTES
"PCP: Christina Souza MD   I have reviewed existing histories, notes, past medical history, surgical history, social history, family history, med list, allergy list, and immunization, and updated.    HPI:   Routine Follow up   Doing well.  Has had swelling of right foot for one year, since she was in hospital. No pain in calf no pain with feet.  Has not been to podiatrist,  or dermatologist for screening. She will do.  Up to date with vaccines  Due for dexa.  Has order for mammogram    Lab Results   Component Value Date    WBC 4.6 07/11/2023    HGB 12.1 07/11/2023    HCT 38.9 07/11/2023     07/11/2023    CHOL 153 07/11/2023    TRIG 88 07/11/2023    HDL 61.4 07/11/2023    ALT 13 07/11/2023    AST 15 07/11/2023     07/11/2023    K 4.7 07/11/2023    CL 99 07/11/2023    CREATININE 0.74 07/11/2023    BUN 20 07/11/2023    CO2 30 07/11/2023    INR 1.0 01/27/2023    HGBA1C 6.5 (A) 07/11/2023     Physical exam:  /72   Pulse 88   Temp 36.7 °C (98 °F)   Ht 1.689 m (5' 6.5\")   Wt 132 kg (291 lb)   SpO2 95%   BMI 46.26 kg/m²       Neck exam showed no mass, lymphadenopathy, or thyroid enlargement, and no carotid bruit.  Heart exam showed normal heart sounds, no murmur, clicks, gallops or rubs. Regular rate and rhythm. Chest is clear; no wheezes or rales.   No feet lesions.   right foot some pitting edema.  No pain.  negative homans  PPP    Assessments/orders:   1. Arteriosclerosis of coronary artery        2. Dyslipidemia        3. Essential (primary) hypertension        4. Hyperlipidemia, unspecified hyperlipidemia type        5. Primary hypertension        6. Morbid (severe) obesity due to excess calories (CMS/[facility])        7. Type 2 diabetes mellitus without complication, without long-term current use of insulin (CMS/[facility])        8. Gastro-esophageal reflux disease without esophagitis        9. Meningioma (CMS/[facility])        Medication conditions are all stable   Will get updated labs  Follow up 6 months  She " will find a dermatologist, and podiatrist

## 2024-01-12 ENCOUNTER — ANCILLARY PROCEDURE (OUTPATIENT)
Dept: RADIOLOGY | Facility: CLINIC | Age: 79
End: 2024-01-12
Payer: MEDICARE

## 2024-01-12 DIAGNOSIS — Z78.0 MENOPAUSE: ICD-10-CM

## 2024-01-12 PROCEDURE — 77080 DXA BONE DENSITY AXIAL: CPT

## 2024-01-12 PROCEDURE — 77080 DXA BONE DENSITY AXIAL: CPT | Performed by: RADIOLOGY

## 2024-01-14 LAB
EST. AVERAGE GLUCOSE BLD GHB EST-MCNC: 143 MG/DL
HBA1C MFR BLD: 6.6 %

## 2024-01-17 DIAGNOSIS — E83.52 HYPERCALCEMIA: Primary | ICD-10-CM

## 2024-01-22 ENCOUNTER — TELEPHONE (OUTPATIENT)
Dept: PRIMARY CARE | Facility: CLINIC | Age: 79
End: 2024-01-22
Payer: MEDICARE

## 2024-01-22 DIAGNOSIS — I10 ESSENTIAL (PRIMARY) HYPERTENSION: ICD-10-CM

## 2024-01-22 DIAGNOSIS — E83.52 HYPERCALCEMIA: Primary | ICD-10-CM

## 2024-01-22 RX ORDER — LISINOPRIL 20 MG/1
20 TABLET ORAL DAILY
Qty: 90 TABLET | Refills: 1 | Status: SHIPPED | OUTPATIENT
Start: 2024-01-22 | End: 2024-04-22

## 2024-01-22 NOTE — TELEPHONE ENCOUNTER
Let pt know  that her labs showed Dm is controlled, normal liver kidney functions. But calcium level continued to be high. Will need to stop the hydrochlorothiazide (it is in the combination med, lisinopril/hydrochlorothiazide).    So please stop taking lisinopril/hydrochlorothiazide.  And start taking lisinopril, rx sent to her pharmacy    After 2-3 weeks please repeat labs. Lab orders are in.    Laurie, could you please check her bp in office ([place] only visit) in 2-3 weeks time as well?    Thanks

## 2024-01-29 ENCOUNTER — TELEPHONE (OUTPATIENT)
Dept: PRIMARY CARE | Facility: CLINIC | Age: 79
End: 2024-01-29
Payer: MEDICARE

## 2024-01-29 NOTE — TELEPHONE ENCOUNTER
Due to atorvastatin having calcium pt would like to know if she should change the atorvastatin as well. She c/o a lot of phloem and would like to know what she should take for it

## 2024-01-29 NOTE — TELEPHONE ENCOUNTER
The calcium in the atorvastatin med does not contribute to calcium elevation. No need to cahange that.  She can take mucinex DM for the phlegm.   But she is has fever, trouble breathing, body aches etc, should get checked

## 2024-02-02 DIAGNOSIS — E11.9 TYPE 2 DIABETES MELLITUS WITHOUT COMPLICATIONS (MULTI): ICD-10-CM

## 2024-02-02 DIAGNOSIS — E78.5 HYPERLIPIDEMIA, UNSPECIFIED: ICD-10-CM

## 2024-02-02 RX ORDER — ATORVASTATIN CALCIUM 40 MG/1
40 TABLET, FILM COATED ORAL DAILY
Qty: 90 TABLET | Refills: 1 | Status: SHIPPED | OUTPATIENT
Start: 2024-02-02

## 2024-02-02 RX ORDER — METFORMIN HYDROCHLORIDE 500 MG/1
500 TABLET ORAL 2 TIMES DAILY
Qty: 180 TABLET | Refills: 1 | Status: SHIPPED | OUTPATIENT
Start: 2024-02-02

## 2024-02-08 ENCOUNTER — LAB (OUTPATIENT)
Dept: LAB | Facility: LAB | Age: 79
End: 2024-02-08
Payer: MEDICARE

## 2024-02-08 DIAGNOSIS — E83.52 HYPERCALCEMIA: ICD-10-CM

## 2024-02-08 LAB
CALCIUM SERPL-MCNC: 10.7 MG/DL (ref 8.6–10.6)
PHOSPHATE SERPL-MCNC: 3 MG/DL (ref 2.5–4.9)
PTH-INTACT SERPL-MCNC: 93 PG/ML (ref 18.5–88)

## 2024-02-08 PROCEDURE — 84100 ASSAY OF PHOSPHORUS: CPT

## 2024-02-08 PROCEDURE — 82310 ASSAY OF CALCIUM: CPT

## 2024-02-08 PROCEDURE — 83970 ASSAY OF PARATHORMONE: CPT

## 2024-02-08 PROCEDURE — 36415 COLL VENOUS BLD VENIPUNCTURE: CPT

## 2024-02-09 PROBLEM — E11.65 TYPE 2 DIABETES MELLITUS WITH HYPERGLYCEMIA (MULTI): Status: ACTIVE | Noted: 2023-07-10

## 2024-02-12 ENCOUNTER — OFFICE VISIT (OUTPATIENT)
Dept: PRIMARY CARE | Facility: CLINIC | Age: 79
End: 2024-02-12
Payer: MEDICARE

## 2024-02-12 VITALS — DIASTOLIC BLOOD PRESSURE: 70 MMHG | SYSTOLIC BLOOD PRESSURE: 106 MMHG

## 2024-02-12 DIAGNOSIS — I10 PRIMARY HYPERTENSION: Primary | ICD-10-CM

## 2024-02-12 PROCEDURE — 1036F TOBACCO NON-USER: CPT | Performed by: INTERNAL MEDICINE

## 2024-02-12 PROCEDURE — 1123F ACP DISCUSS/DSCN MKR DOCD: CPT | Performed by: INTERNAL MEDICINE

## 2024-02-12 PROCEDURE — 3078F DIAST BP <80 MM HG: CPT | Performed by: INTERNAL MEDICINE

## 2024-02-12 PROCEDURE — 3074F SYST BP LT 130 MM HG: CPT | Performed by: INTERNAL MEDICINE

## 2024-02-12 PROCEDURE — 1125F AMNT PAIN NOTED PAIN PRSNT: CPT | Performed by: INTERNAL MEDICINE

## 2024-02-12 PROCEDURE — 1159F MED LIST DOCD IN RCRD: CPT | Performed by: INTERNAL MEDICINE

## 2024-02-12 NOTE — PROGRESS NOTES
Reviewed labs, calcium level is better off hydrochlorothiazide. Bp is also still good. Keep the follow up in 5 months

## 2024-03-21 ENCOUNTER — OFFICE VISIT (OUTPATIENT)
Dept: NEUROLOGY | Facility: CLINIC | Age: 79
End: 2024-03-21
Payer: MEDICARE

## 2024-03-21 ENCOUNTER — LAB (OUTPATIENT)
Dept: LAB | Facility: LAB | Age: 79
End: 2024-03-21
Payer: MEDICARE

## 2024-03-21 VITALS
BODY MASS INDEX: 45.48 KG/M2 | WEIGHT: 289.8 LBS | SYSTOLIC BLOOD PRESSURE: 125 MMHG | HEART RATE: 80 BPM | DIASTOLIC BLOOD PRESSURE: 60 MMHG | RESPIRATION RATE: 18 BRPM | HEIGHT: 67 IN | TEMPERATURE: 97.3 F

## 2024-03-21 DIAGNOSIS — M79.2 NEURALGIA: ICD-10-CM

## 2024-03-21 DIAGNOSIS — D32.9 MENINGIOMA (MULTI): Primary | ICD-10-CM

## 2024-03-21 LAB — LAMOTRIGINE SERPL-MCNC: 6.5 UG/ML (ref 2.5–15)

## 2024-03-21 PROCEDURE — 36415 COLL VENOUS BLD VENIPUNCTURE: CPT

## 2024-03-21 PROCEDURE — 1123F ACP DISCUSS/DSCN MKR DOCD: CPT | Performed by: PSYCHIATRY & NEUROLOGY

## 2024-03-21 PROCEDURE — 3078F DIAST BP <80 MM HG: CPT | Performed by: PSYCHIATRY & NEUROLOGY

## 2024-03-21 PROCEDURE — 1160F RVW MEDS BY RX/DR IN RCRD: CPT | Performed by: PSYCHIATRY & NEUROLOGY

## 2024-03-21 PROCEDURE — 1159F MED LIST DOCD IN RCRD: CPT | Performed by: PSYCHIATRY & NEUROLOGY

## 2024-03-21 PROCEDURE — 80175 DRUG SCREEN QUAN LAMOTRIGINE: CPT

## 2024-03-21 PROCEDURE — 3074F SYST BP LT 130 MM HG: CPT | Performed by: PSYCHIATRY & NEUROLOGY

## 2024-03-21 PROCEDURE — 1036F TOBACCO NON-USER: CPT | Performed by: PSYCHIATRY & NEUROLOGY

## 2024-03-21 PROCEDURE — 99214 OFFICE O/P EST MOD 30 MIN: CPT | Performed by: PSYCHIATRY & NEUROLOGY

## 2024-03-21 NOTE — PATIENT INSTRUCTIONS
"It was a pleasure seeing you today.   Get bloodwork.     Please make a follow up appointment in 6 months.      For any urgent issues or needing to speak to a medical assistant please call [phone], option 6 during our office hours Monday-Friday 8am-4pm, and leave a voicemail with your concern.  My office will try to reach back you as soon as possible within 24 (business) hours.  If you have an emergency please call 911 or visit a local urgent care or nearest emergency room.      Please understand that mascotsecret is a useful communication tool for simple \"normal\" results or a refill request but I would not recommend using this tool for emergent or urgent issues or for conversations with me.  I am happy to ask my staff to rearrange a follow up visit or a virtual visit sooner than requested if appropriate for your care.    "

## 2024-03-21 NOTE — PROGRESS NOTES
Subjective     Amy Ruby is a 79 y.o. year old female here for [place] / meningioma follow up.     HPI    Having more facial pain.   MRI brain 8-31-23 unchanged, similar size of meningioma inferior left tentorium.      Saw pain mgmt but not following now.  Saw Dr. Lindsay in October.      current meds:   Gabapentin 900mg 1 tab three times daily.   Lamotrigine 150mg BID   Baclofen 10mg TID     Baclofen caused her to be too sleepy - she stopped the morning pill. takes Baclofen at night.         hx:   starting sept 2018 left temporal HA , sharp, sometimes shocking type pain -- last 2 minutes at a time, clustered in about an hour- started in Sept, went away after about 2 weeks, then came back again in late January - [facility] ER in late January for headaches.- had ESR 27, when laying down the headaches went away. when bending forward would trigger them. that day she had an excruciating sudden headache- which resolved in 1 day.  headaches dissipated. laying down was fine. CT head was done and was normal. has not had HA since January.   no tearing/ no nasal discharge/ no vision change/ no double vision/ no photosensitivity / no phonophobia.  when blinking - she gets a shooting pain near left eye/temporal area- when blowing her nose or putting cream on her face she get elicit a brief shooting pain. sometimes has jaw tenderness, some misalignment of jaw.   tried tylenol - did not help so much.      MRA head /neck unremarkable.   Review of Systems    Patient Active Problem List   Diagnosis    Trigeminal neuralgia    Seborrheic keratosis    Rosacea    Posterior vitreous detachment of left eye    Presbyopia    Postmenopausal bleeding    Positive self-administered antigen test for COVID-19    History of severe acute respiratory syndrome coronavirus 2 (SARS-CoV-2) disease    Perforation of gallbladder    Nuclear senile cataract    Neuralgia    Obesity, unspecified    Morbid (severe) obesity due to excess calories (CMS/HCC)     Migraines    Migraine headache    Neoplasm of meninges    Meningioma (CMS/HCC)    Injury of kidney    Essential (primary) hypertension    Hypertension    Hypercalcemia    High level of cardiac marker    Fall from or off toilet without subsequent striking against object, subsequent encounter    Polyp of endometrium    Endometrial polyp    Dyslipidemia    Osteoarthritis of knee    Dermatochalasis    Dehydration    Cough    Type 2 diabetes mellitus without complication (CMS/HCC)    Combined form of age-related cataract, both eyes    Calculus of gallbladder with acute cholecystitis without obstruction    Astigmatism    Arteriosclerosis of coronary artery    Allergic rhinitis    Allergic conjunctivitis of both eyes    Derangement of anterior horn of medial meniscus    Abscess of liver    Obstructive sleep apnea syndrome    Gastro-esophageal reflux disease without esophagitis    Hyperlipidemia, unspecified    Muscle weakness (generalized)    Personal history of benign neoplasm of the brain    Rhabdomyolysis    Hypotension    Long term (current) use of oral hypoglycemic drugs    Acute kidney failure, unspecified (CMS/HCC)    Other fecal abnormalities    Acquired absence of other specified parts of digestive tract    Type 2 diabetes mellitus with hyperglycemia (CMS/HCC)     Past Medical History:   Diagnosis Date    Age-related nuclear cataract, left eye 01/20/2020    Age-related nuclear cataract, left    Age-related nuclear cataract, right eye 01/20/2020    Age-related nuclear cataract, right    Cholecystitis, acute 07/10/2023    Cortical age-related cataract, left eye 01/20/2020    Cortical age-related cataract of left eye    Cortical age-related cataract, right eye 01/20/2020    Cortical age-related cataract of right eye    Encounter for general adult medical examination without abnormal findings 09/07/2022    Medicare annual wellness visit, subsequent    Pain in right knee 11/12/2015    Knee pain, bilateral     Perforation of gallbladder 01/03/2023    Personal history of other diseases of the circulatory system     History of essential hypertension    Personal history of other diseases of the circulatory system 03/06/2017    History of abnormal electrocardiography    Personal history of other diseases of the respiratory system     History of bronchitis    Personal history of other endocrine, nutritional and metabolic disease     History of hyperlipidemia    Personal history of other endocrine, nutritional and metabolic disease     History of diabetes mellitus     Past Surgical History:   Procedure Laterality Date    CHOLECYSTECTOMY      KNEE SURGERY  02/16/2017    Knee Surgery Right    MR HEAD ANGIO WO IV CONTRAST  03/07/2019    MR HEAD ANGIO WO IV CONTRAST 3/7/2019 [facility] ANCILLARY LEGACY    MR NECK ANGIO WO IV CONTRAST  03/07/2019    MR NECK ANGIO WO IV [place]AST [place] [facility] [place]    OTHER SURGICAL HISTORY  03/06/2017    Arterial Catheterization    OTHER SURGICAL HISTORY  05/08/2017    Hysteroscopy    TONSILLECTOMY  02/16/2017    Tonsillectomy     Social History     Tobacco Use    Smoking status: Never    Smokeless tobacco: Never   Substance Use Topics    Alcohol use: Never     family history includes Cardiac Disorder in her mother; Dementia in her mother; Diabetes in her brother, father, and sister; Hypertension in her brother, mother, and sister; Prostate cancer in her father; Seizures in her father.    Current Outpatient Medications:     aspirin 81 mg EC tablet, Take by mouth once daily., Disp: , Rfl:     atorvastatin (Lipitor) 40 mg tablet, TAKE 1 TABLET BY MOUTH EVERY DAY, Disp: 90 tablet, Rfl: 1    baclofen (Lioresal) 10 mg tablet, TAKE 1 TABLET BY MOUTH THREE TIMES DAILY, Disp: 270 tablet, Rfl: 3    gabapentin (Neurontin) 300 mg capsule, Take 1 capsule (300 mg) by mouth 3 times a day. TITRATE AS DIRECTED., Disp: 270 capsule, Rfl: 3    gabapentin (Neurontin) 600 mg tablet, Take 1 tablet (600 mg) by mouth 3  "times a day., Disp: 270 tablet, Rfl: 3    lamoTRIgine (LaMICtal) 100 mg tablet, Take 1 tablet (100 mg) by mouth 2 times a day., Disp: 180 tablet, Rfl: 3    lisinopril 20 mg tablet, Take 1 tablet (20 mg) by mouth once daily., Disp: 90 tablet, Rfl: 1    metFORMIN (Glucophage) 500 mg tablet, TAKE 1 TABLET BY MOUTH TWICE A DAY, Disp: 180 tablet, Rfl: 1  Allergies   Allergen Reactions    Other Other     /60 (BP Location: Right arm)   Pulse 80   Temp 36.3 °C (97.3 °F)   Resp 18   Ht 1.689 m (5' 6.5\")   Wt 131 kg (289 lb 12.8 oz)   BMI 46.07 kg/m²   Neurological Exam/Physical Exam:    Constitutional: General appearance: no acute distress. Pleasant.   Auscultation of Heart: Regular rate and rhythm, no murmurs, normal S1 and S2.   Carotid Arteries: no bruits  Peripheral Vascular Exam: No swelling, edema or tenderness to palpation in extremities  Mental status: no distress, alert, interactive and cooperative. Affect is appropriate.   Orientation: oriented to person, oriented to place and oriented to time.   Memory: recent memory intact and remote memory intact.   Attention: normal attention /concentration.   Language: normal comprehension and naming.   Fund of knowledge: Patient displays adequate knowledge of current events.  Eyes: The ophthalmoscopic examination was normal.   Cranial nerve II: Visual fields full to confrontation.   Cranial nerves III, IV, and VI: Pupils round, equally reactive to light; EOMs intact. No nystagmus.   Cranial Nerve V: dysesthesia on L cheek, V2 mostly and v1. LT and PP are WNL b/l.   Cranial nerve VII: no facial droop  Cranial nerve VIII: Hearing is intact  Cranial nerves IX and X: Palate elevates symmetrically.   Cranial nerve XI: Shoulder shrug intact.   Cranial nerve XII: Tongue is midline.  Motor:  Muscle bulk was normal in both upper and lower extremities.    No atrophy.   Strength is normal.   Deep Tendon Reflexes: left biceps 2+ , right biceps 2+, left triceps 2+, right " triceps 2+, left brachioradialis 2+, right brachioradialis 2+, left patella 2+, right patella 2+, left ankle jerk 2+, right ankle jerk 2+   Plantar Reflex: Toes downgoing to plantar stimulation on the left. Toes downgoing to plantar stimulation on the right.   Sensory Exam: Normal to vibratory sensation  Coordination:  no limb dystaxia and rapid alternating movements are intact.   Gait:  cautious.         Labs:  CBC:   Lab Results   Component Value Date    WBC 4.6 07/11/2023    HGB 12.1 07/11/2023    HCT 38.9 07/11/2023     07/11/2023     BMP:   Lab Results   Component Value Date     01/11/2024    K 4.4 01/11/2024    CL 98 01/11/2024    CO2 30 01/11/2024    BUN 18 01/11/2024    CREATININE 0.88 01/11/2024    CALCIUM 10.7 (H) 02/08/2024    MG 1.40 (L) 01/03/2023    PHOS 3.0 02/08/2024     LFT:   Lab Results   Component Value Date    ALKPHOS 98 07/11/2023    BILITOT 0.5 07/11/2023    BILIDIR 0.1 11/04/2019    PROT 6.8 07/11/2023    ALBUMIN 4.6 07/11/2023    ALT 13 07/11/2023    AST 15 07/11/2023         Assessment/Plan   Problem List Items Addressed This Visit             ICD-10-CM    Neuralgia M79.2    Meningioma (CMS/HCC) - Primary D32.9       neuralgia pain is secondary to her growing meningioma.      steroids are not safe long term treatments for this condition and also not evidence based.     Tegretol 400mg twice daily - did not help.   ? oxcarbazepine is another option.  future - trileptal is option.   gabapentin / lamictal/baclofen combination.   f/u NSGY

## 2024-04-22 DIAGNOSIS — I10 ESSENTIAL (PRIMARY) HYPERTENSION: ICD-10-CM

## 2024-04-22 RX ORDER — LISINOPRIL 20 MG/1
20 TABLET ORAL DAILY
Qty: 90 TABLET | Refills: 1 | Status: SHIPPED | OUTPATIENT
Start: 2024-04-22

## 2024-07-11 ENCOUNTER — APPOINTMENT (OUTPATIENT)
Dept: PRIMARY CARE | Facility: CLINIC | Age: 79
End: 2024-07-11
Payer: MEDICARE

## 2024-07-14 ASSESSMENT — EXTERNAL EXAM - RIGHT EYE: OD_EXAM: NORMAL

## 2024-07-14 ASSESSMENT — CUP TO DISC RATIO
OS_RATIO: .4
OD_RATIO: .4

## 2024-07-14 ASSESSMENT — EXTERNAL EXAM - LEFT EYE: OS_EXAM: NORMAL

## 2024-07-14 NOTE — PROGRESS NOTES
CikzydzrnlO46.9  -Color plates (7/15/24) - 11/11 OU  -Dx after imaging due to shooting pain/headaches. No diplopia or change in vision. S/p gamma knife with rad/onc November 2020.   -OCT RNFL (7/15/24) - SS: 7/10 OD 8/10 OS. OD: WNL. OS: Borderline IT. 89/80. Similar to 7/30/21.   -HVF 24-2 (7/15/24) - OD: 3/12 FL 4%FP. Scattered but WNL. OS: 2/15FL 7%FP 8%FN. Nasal depression. Similar to 1/20/20.   -MRI brain (9/8/22) - Dural-based mass extending from the inferior left tentorium into the posterior aspect of the left [place] is similar to in size and appearance to the prior exam. No anterior extension into the foramen rotundum or foramen ovale is noted. The white matter has a few punctate hyperintensities not unusual for patient`s age which may be secondary to hypertension or migraine among others, unchanged.   -MRI brain w/wo contrast (8/31/23) - 1. Unchanged meningioma arising from the inferior left tentorium and extending into the posterior aspect of left [place]. 2. No evidence of acute infarct, intracranial mass effect or midline   shift.   -Followed by neurosurgery and neurology.   -F/u 1 year and recheck refraction, glare/BAT, dilation, color plates, HVF 24-2, OCT RNFL. Plan for Lenstar/Pentacam/OCT macula only if cataract(s) visually significant.    Controlled diabetes mellitus type II without nwaoctozgzudS29.9  -HbA1c= 6.6 (1/11/24). No diabetic retinopathy seen on last dilated exam. Continue close monitoring of blood glucose, blood pressure, and cholesterol. Plan for annual dilated eye exam.    Combined form of age-related cataract, right eyeH25.811  Combined form of age-related cataract, left eyeH25.812  -Mildly visually significant, but no impact on ADL`s. Monitor for now. F/u 12 months to re-evaluate -- recheck refraction, glare/BAT, dilation, color plates, HVF 24-2, OCT RNFL. Plan for Lenstar/Pentacam/OCT macula only if cataract(s) visually significant.    PVD (posterior vitreous  detachment), left eyeH43.812  -No retinal tear or detachment seen on exam. Retinal detachment symptoms discussed at previous visit.    Allergic conjunctivitis of both eyesH10.13  -Patient with seasonal allergies (spring/fall) and itchy eyes  -Recommend start with artificial tears PRN, can also try cool compresses for severe itching  -May also try OTC allergy gtts (Zaditor, Ketotifen, Alaway) or oral antihistamine.     LcgvrzehiatncwyT89.839  -Patient noticing worsening of dermatochalasis RUL>[place]. May refer to oculoplastic surgeon as needed. Patient wishes to defer for now.     OteaaajdadiM46.209  NafirwxkldQ39.4  -Rx given 2023, did not fill  -May continue OTC reading glasses as needed.       No history of intraocular surgery/refractive surgery.   No FH of AMD/glaucoma

## 2024-07-15 ENCOUNTER — OFFICE VISIT (OUTPATIENT)
Dept: OPHTHALMOLOGY | Facility: CLINIC | Age: 79
End: 2024-07-15
Payer: MEDICARE

## 2024-07-15 DIAGNOSIS — H02.831 DERMATOCHALASIS OF BOTH UPPER EYELIDS: ICD-10-CM

## 2024-07-15 DIAGNOSIS — E11.9 DIABETES MELLITUS WITHOUT COMPLICATION (MULTI): ICD-10-CM

## 2024-07-15 DIAGNOSIS — H25.811 COMBINED FORM OF AGE-RELATED CATARACT, RIGHT EYE: ICD-10-CM

## 2024-07-15 DIAGNOSIS — D32.9 MENINGIOMA (MULTI): Primary | ICD-10-CM

## 2024-07-15 DIAGNOSIS — H52.202 ASTIGMATISM OF LEFT EYE, UNSPECIFIED TYPE: ICD-10-CM

## 2024-07-15 DIAGNOSIS — H52.4 PRESBYOPIA: ICD-10-CM

## 2024-07-15 DIAGNOSIS — H25.812 COMBINED FORM OF AGE-RELATED CATARACT, LEFT EYE: ICD-10-CM

## 2024-07-15 DIAGNOSIS — H10.13 ALLERGIC CONJUNCTIVITIS OF BOTH EYES: ICD-10-CM

## 2024-07-15 DIAGNOSIS — H43.812 PVD (POSTERIOR VITREOUS DETACHMENT), LEFT EYE: ICD-10-CM

## 2024-07-15 DIAGNOSIS — H02.834 DERMATOCHALASIS OF BOTH UPPER EYELIDS: ICD-10-CM

## 2024-07-15 PROBLEM — C69.02: Status: ACTIVE | Noted: 2024-07-15

## 2024-07-15 PROCEDURE — 92083 EXTENDED VISUAL FIELD XM: CPT | Performed by: OPHTHALMOLOGY

## 2024-07-15 PROCEDURE — 92133 CPTRZD OPH DX IMG PST SGM ON: CPT | Performed by: OPHTHALMOLOGY

## 2024-07-15 PROCEDURE — 99214 OFFICE O/P EST MOD 30 MIN: CPT | Performed by: OPHTHALMOLOGY

## 2024-07-15 ASSESSMENT — REFRACTION_MANIFEST
OD_ADD: +2.50
OS_ADD: +2.50
OS_AXIS: 075
OS_SPHERE: PLANO
OS_CYLINDER: -0.50
OD_SPHERE: PLANO
OD_CYLINDER: SPHERE

## 2024-07-15 ASSESSMENT — ENCOUNTER SYMPTOMS
NEUROLOGICAL NEGATIVE: 0
PSYCHIATRIC NEGATIVE: 0
ALLERGIC/IMMUNOLOGIC NEGATIVE: 0
RESPIRATORY NEGATIVE: 0
CONSTITUTIONAL NEGATIVE: 0
ENDOCRINE NEGATIVE: 0
GASTROINTESTINAL NEGATIVE: 0
HEMATOLOGIC/LYMPHATIC NEGATIVE: 0
CARDIOVASCULAR NEGATIVE: 0
MUSCULOSKELETAL NEGATIVE: 0
EYES NEGATIVE: 1

## 2024-07-15 ASSESSMENT — VISUAL ACUITY
METHOD: SNELLEN - LINEAR
OD_SC: 20/20
OS_SC: 20/30+

## 2024-07-15 ASSESSMENT — TONOMETRY
OD_IOP_MMHG: 16
IOP_METHOD: GOLDMANN APPLANATION
OS_IOP_MMHG: 16

## 2024-07-22 ENCOUNTER — TELEPHONE (OUTPATIENT)
Dept: PRIMARY CARE | Facility: CLINIC | Age: 79
End: 2024-07-22
Payer: MEDICARE

## 2024-07-22 DIAGNOSIS — Z12.31 ENCOUNTER FOR SCREENING MAMMOGRAM FOR MALIGNANT NEOPLASM OF BREAST: ICD-10-CM

## 2024-07-29 DIAGNOSIS — E11.9 TYPE 2 DIABETES MELLITUS WITHOUT COMPLICATIONS (MULTI): ICD-10-CM

## 2024-07-29 DIAGNOSIS — E78.5 HYPERLIPIDEMIA, UNSPECIFIED: ICD-10-CM

## 2024-07-29 RX ORDER — METFORMIN HYDROCHLORIDE 500 MG/1
500 TABLET ORAL 2 TIMES DAILY
Qty: 180 TABLET | Refills: 1 | Status: SHIPPED | OUTPATIENT
Start: 2024-07-29

## 2024-07-29 RX ORDER — ATORVASTATIN CALCIUM 40 MG/1
40 TABLET, FILM COATED ORAL DAILY
Qty: 90 TABLET | Refills: 1 | Status: SHIPPED | OUTPATIENT
Start: 2024-07-29

## 2024-08-16 DIAGNOSIS — G50.0 TRIGEMINAL NEURALGIA: Primary | ICD-10-CM

## 2024-08-16 RX ORDER — LAMOTRIGINE 150 MG/1
TABLET ORAL
Qty: 180 TABLET | Refills: 3 | Status: SHIPPED | OUTPATIENT
Start: 2024-08-16

## 2024-08-19 ENCOUNTER — HOSPITAL ENCOUNTER (OUTPATIENT)
Dept: RADIOLOGY | Facility: CLINIC | Age: 79
Discharge: HOME | End: 2024-08-19
Payer: MEDICARE

## 2024-08-19 VITALS — HEIGHT: 67 IN | WEIGHT: 280 LBS | BODY MASS INDEX: 43.95 KG/M2

## 2024-08-19 DIAGNOSIS — Z12.31 ENCOUNTER FOR SCREENING MAMMOGRAM FOR MALIGNANT NEOPLASM OF BREAST: ICD-10-CM

## 2024-08-19 PROCEDURE — 77067 SCR MAMMO BI INCL CAD: CPT | Performed by: STUDENT IN AN ORGANIZED HEALTH CARE EDUCATION/TRAINING PROGRAM

## 2024-08-19 PROCEDURE — 77063 BREAST TOMOSYNTHESIS BI: CPT | Performed by: STUDENT IN AN ORGANIZED HEALTH CARE EDUCATION/TRAINING PROGRAM

## 2024-08-19 PROCEDURE — 77067 SCR MAMMO BI INCL CAD: CPT

## 2024-09-04 DIAGNOSIS — G50.0 TRIGEMINAL NEURALGIA: ICD-10-CM

## 2024-09-04 DIAGNOSIS — M79.2 NEURALGIA AND NEURITIS, UNSPECIFIED: ICD-10-CM

## 2024-09-05 RX ORDER — LAMOTRIGINE 100 MG/1
TABLET ORAL 2 TIMES DAILY
Qty: 180 TABLET | Refills: 3 | Status: SHIPPED | OUTPATIENT
Start: 2024-09-05

## 2024-09-05 RX ORDER — BACLOFEN 10 MG/1
10 TABLET ORAL 3 TIMES DAILY
Qty: 270 TABLET | Refills: 3 | Status: SHIPPED | OUTPATIENT
Start: 2024-09-05

## 2024-09-19 ENCOUNTER — APPOINTMENT (OUTPATIENT)
Dept: NEUROLOGY | Facility: CLINIC | Age: 79
End: 2024-09-19
Payer: MEDICARE

## 2024-09-19 VITALS
BODY MASS INDEX: 43.95 KG/M2 | WEIGHT: 280 LBS | HEART RATE: 66 BPM | SYSTOLIC BLOOD PRESSURE: 119 MMHG | DIASTOLIC BLOOD PRESSURE: 71 MMHG | HEIGHT: 67 IN

## 2024-09-19 DIAGNOSIS — R25.1 TREMOR: ICD-10-CM

## 2024-09-19 DIAGNOSIS — G50.9 DISORDER OF TRIGEMINAL NERVE, UNSPECIFIED: ICD-10-CM

## 2024-09-19 DIAGNOSIS — M79.2 NEURALGIA: ICD-10-CM

## 2024-09-19 PROCEDURE — 3074F SYST BP LT 130 MM HG: CPT | Performed by: PSYCHIATRY & NEUROLOGY

## 2024-09-19 PROCEDURE — 1160F RVW MEDS BY RX/DR IN RCRD: CPT | Performed by: PSYCHIATRY & NEUROLOGY

## 2024-09-19 PROCEDURE — 1123F ACP DISCUSS/DSCN MKR DOCD: CPT | Performed by: PSYCHIATRY & NEUROLOGY

## 2024-09-19 PROCEDURE — 99214 OFFICE O/P EST MOD 30 MIN: CPT | Performed by: PSYCHIATRY & NEUROLOGY

## 2024-09-19 PROCEDURE — 3078F DIAST BP <80 MM HG: CPT | Performed by: PSYCHIATRY & NEUROLOGY

## 2024-09-19 PROCEDURE — 1159F MED LIST DOCD IN RCRD: CPT | Performed by: PSYCHIATRY & NEUROLOGY

## 2024-09-19 PROCEDURE — 1036F TOBACCO NON-USER: CPT | Performed by: PSYCHIATRY & NEUROLOGY

## 2024-09-19 RX ORDER — GABAPENTIN 600 MG/1
600 TABLET ORAL 3 TIMES DAILY
Qty: 270 TABLET | Refills: 3 | Status: SHIPPED | OUTPATIENT
Start: 2024-09-19

## 2024-09-19 RX ORDER — GABAPENTIN 300 MG/1
300 CAPSULE ORAL 3 TIMES DAILY
Qty: 270 CAPSULE | Refills: 3 | Status: SHIPPED | OUTPATIENT
Start: 2024-09-19

## 2024-09-19 NOTE — PROGRESS NOTES
Subjective     Amy Ruby is a 79 y.o. year old female here for TN / meningioma follow up.     HPI    Less facial pain overall.  She is doing well.     MRI brain 8-31-23 unchanged, similar size of meningioma inferior left tentorium.      current meds:   Gabapentin 900mg 1 tab three times daily.   Lamotrigine 150mg BID   Baclofen 10mg TID       hand tremors are present but not very bothersome.  Carrying food can be difficult.   hx:   starting sept 2018 left temporal HA , sharp, sometimes shocking type pain -- last 2 minutes at a time, clustered in about an hour- started in Sept, went away after about 2 weeks, then came back again in late January - [facility] ER in late January for headaches.- had ESR 27, when laying down the headaches went away. when bending forward would trigger them. that day she had an excruciating sudden headache- which resolved in 1 day.  headaches dissipated. laying down was fine. CT head was done and was normal. has not had HA since January.   no tearing/ no nasal discharge/ no vision change/ no double vision/ no photosensitivity / no phonophobia.  when blinking - she gets a shooting pain near left eye/temporal area- when blowing her nose or putting cream on her face she get elicit a brief shooting pain. sometimes has jaw tenderness, some misalignment of jaw.   tried tylenol - did not help so much.      MRA head /neck unremarkable.   Review of Systems    Patient Active Problem List   Diagnosis    Trigeminal neuralgia    Seborrheic keratosis    Rosacea    Posterior vitreous detachment of left eye    Presbyopia    Postmenopausal bleeding    Positive self-administered antigen test for COVID-19    History of severe acute respiratory syndrome coronavirus 2 (SARS-CoV-2) disease    Perforation of gallbladder    Nuclear senile cataract    Neuralgia    Obesity, unspecified    Morbid (severe) obesity due to excess calories (CMS/HCC)    Migraines    Migraine headache    Neoplasm of meninges     Meningioma (CMS/HCC)    Injury of kidney    Essential (primary) hypertension    Hypertension    Hypercalcemia    High level of cardiac marker    Fall from or off toilet without subsequent striking against object, subsequent encounter    Polyp of endometrium    Endometrial polyp    Dyslipidemia    Osteoarthritis of knee    Dermatochalasis    Dehydration    Cough    Type 2 diabetes mellitus without complication (CMS/HCC)    Combined form of age-related cataract, both eyes    Calculus of gallbladder with acute cholecystitis without obstruction    Astigmatism    Arteriosclerosis of coronary artery    Allergic rhinitis    Allergic conjunctivitis of both eyes    Derangement of anterior horn of medial meniscus    Abscess of liver    Obstructive sleep apnea syndrome    Gastro-esophageal reflux disease without esophagitis    Hyperlipidemia, unspecified    Muscle weakness (generalized)    Personal history of benign neoplasm of the brain    Rhabdomyolysis    Hypotension    Long term (current) use of oral hypoglycemic drugs    Acute kidney failure, unspecified (CMS/HCC)    Other fecal abnormalities    Acquired absence of other specified parts of digestive tract    Type 2 diabetes mellitus with hyperglycemia (CMS/HCC)     Past Medical History:   Diagnosis Date    Age-related nuclear cataract, left eye 01/20/2020    Age-related nuclear cataract, left    Age-related nuclear cataract, right eye 01/20/2020    Age-related nuclear cataract, right    Cholecystitis, acute 07/10/2023    Cortical age-related cataract, left eye 01/20/2020    Cortical age-related cataract of left eye    Cortical age-related cataract, right eye 01/20/2020    Cortical age-related cataract of right eye    Encounter for general adult medical examination without abnormal findings 09/07/2022    Medicare annual wellness visit, subsequent    Pain in right knee 11/12/2015    Knee pain, bilateral    Perforation of gallbladder 01/03/2023    Personal history of other  diseases of the circulatory system     History of essential hypertension    Personal history of other diseases of the circulatory system 03/06/2017    History of abnormal electrocardiography    Personal history of other diseases of the respiratory system     History of bronchitis    Personal history of other endocrine, nutritional and metabolic disease     History of hyperlipidemia    Personal history of other endocrine, nutritional and metabolic disease     History of diabetes mellitus     Past Surgical History:   Procedure Laterality Date    CHOLECYSTECTOMY      KNEE SURGERY  02/16/2017    Knee Surgery Right    MR HEAD ANGIO WO IV CONTRAST  03/07/2019    [place] HEAD ANGIO [place] [facility] [place]    MR NECK ANGIO WO IV CONTRAST  03/07/2019    MR NECK ANGIO WO IV CONTRAST [place] [facility] [place]    OTHER SURGICAL HISTORY  03/06/2017    Arterial Catheterization    OTHER SURGICAL HISTORY  05/08/2017    Hysteroscopy    TONSILLECTOMY  02/16/2017    Tonsillectomy     Social History     Tobacco Use    Smoking status: Never    Smokeless tobacco: Never   Substance Use Topics    Alcohol use: Never     family history includes Cardiac Disorder in her mother; Dementia in her mother; Diabetes in her brother, father, and sister; Hypertension in her brother, mother, and sister; Prostate cancer in her father; Seizures in her father.    Current Outpatient Medications:     aspirin 81 mg EC tablet, Take by mouth once daily., Disp: , Rfl:     atorvastatin (Lipitor) 40 mg tablet, TAKE 1 TABLET BY MOUTH EVERY DAY, Disp: 90 tablet, Rfl: 1    baclofen (Lioresal) 10 mg tablet, TAKE 1 TABLET BY MOUTH THREE TIMES DAILY, Disp: 270 tablet, Rfl: 3    gabapentin (Neurontin) 300 mg capsule, Take 1 capsule (300 mg) by mouth 3 times a day. TITRATE AS DIRECTED., Disp: 270 capsule, Rfl: 3    gabapentin (Neurontin) 600 mg tablet, Take 1 tablet (600 mg) by mouth 3 times a day., Disp: 270 tablet, Rfl: 3    lamoTRIgine (LaMICtal)  "100 mg tablet, Take 1 tablet (100 mg) by mouth 2 times a day., Disp: 180 tablet, Rfl: 3    lisinopril 20 mg tablet, Take 1 tablet (20 mg) by mouth once daily., Disp: 90 tablet, Rfl: 1    metFORMIN (Glucophage) 500 mg tablet, TAKE 1 TABLET BY MOUTH TWICE A DAY, Disp: 180 tablet, Rfl: 1  Allergies   Allergen Reactions    Other Other     /60 (BP Location: Right arm)   Pulse 80   Temp 36.3 °C (97.3 °F)   Resp 18   Ht 1.689 m (5' 6.5\")   Wt 131 kg (289 lb 12.8 oz)   BMI 46.07 kg/m²   Neurological Exam/Physical Exam:    Constitutional: General appearance: no acute distress. Pleasant.   Auscultation of Heart: Regular rate and rhythm, no murmurs, normal S1 and S2.   Carotid Arteries: no bruits  Peripheral Vascular Exam: No swelling, edema or tenderness to palpation in extremities  Mental status: no distress, alert, interactive and cooperative. Affect is appropriate.   Orientation: oriented to person, oriented to place and oriented to time.   Memory: recent memory intact and remote memory intact.   Attention: normal attention /concentration.   Language: normal comprehension and naming.   Fund of knowledge: Patient displays adequate knowledge of current events.  Eyes: The ophthalmoscopic examination was normal.   Cranial nerve II: Visual fields full to confrontation.   Cranial nerves III, IV, and VI: Pupils round, equally reactive to light; EOMs intact. No nystagmus.   Cranial Nerve V: dysesthesia on L cheek, V2 mostly and v1. LT and PP are WNL b/l.   Cranial nerve VII: no facial droop  Cranial nerve VIII: Hearing is intact  Cranial nerves IX and X: Palate elevates symmetrically.   Cranial nerve XI: Shoulder shrug intact.   Cranial nerve XII: Tongue is midline.  Motor:  Muscle bulk was normal in both upper and lower extremities.    No atrophy.   Strength is normal.   Deep Tendon Reflexes: left biceps 2+ , right biceps 2+, left triceps 2+, right triceps 2+, left brachioradialis 2+, right brachioradialis 2+, left " patella 2+, right patella 2+, left ankle jerk 2+, right ankle jerk 2+   Plantar Reflex: Toes downgoing to plantar stimulation on the left. Toes downgoing to plantar stimulation on the right.   Sensory Exam: Normal to vibratory sensation  Coordination:  no limb dystaxia and rapid alternating movements are intact.   Gait:  cautious.         Labs:  CBC:   Lab Results   Component Value Date    WBC 4.6 07/11/2023    HGB 12.1 07/11/2023    HCT 38.9 07/11/2023     07/11/2023     BMP:   Lab Results   Component Value Date     01/11/2024    K 4.4 01/11/2024    CL 98 01/11/2024    CO2 30 01/11/2024    BUN 18 01/11/2024    CREATININE 0.88 01/11/2024    CALCIUM 10.7 (H) 02/08/2024    MG 1.40 (L) 01/03/2023    PHOS 3.0 02/08/2024     LFT:   Lab Results   Component Value Date    ALKPHOS 98 07/11/2023    BILITOT 0.5 07/11/2023    BILIDIR 0.1 11/04/2019    PROT 6.8 07/11/2023    ALBUMIN 4.6 07/11/2023    ALT 13 07/11/2023    AST 15 07/11/2023         Assessment/Plan   Problem List Items Addressed This Visit             ICD-10-CM    Neuralgia M79.2    Meningioma (CMS/HCC) - Primary D32.9       neuralgia pain secondary to her growing meningioma.     Tegretol 400mg twice daily - did not help.     future - trileptal is option.   gabapentin / lamictal/baclofen combination.

## 2024-09-19 NOTE — PATIENT INSTRUCTIONS
"It was a pleasure seeing you today.     Please make a follow up appointment in 6 months.  You may also schedule a phone or virtual visit sooner on a Friday morning with me as needed before the next clinic appointment.     For any urgent issues or needing to speak to a medical assistant please call [phone], option 6 during our office hours Monday-Friday 8am-4pm, and leave a voicemail with your concern.  My office will try to reach back you as soon as possible within 24 (business) hours.  If you have an emergency please call 911 or visit a local urgent care or nearest emergency room.      Please understand that [employer] is a useful communication tool for simple \"normal\" results or a refill request but I would not recommend using this tool for emergent or urgent issues or for conversations with me.  I am happy to ask my staff to rearrange a follow up visit or a virtual visit sooner than requested if appropriate for your care.    "

## 2024-10-10 ENCOUNTER — LAB (OUTPATIENT)
Dept: LAB | Facility: LAB | Age: 79
End: 2024-10-10
Payer: MEDICARE

## 2024-10-10 ENCOUNTER — APPOINTMENT (OUTPATIENT)
Dept: PRIMARY CARE | Facility: CLINIC | Age: 79
End: 2024-10-10
Payer: MEDICARE

## 2024-10-10 VITALS
SYSTOLIC BLOOD PRESSURE: 133 MMHG | DIASTOLIC BLOOD PRESSURE: 69 MMHG | OXYGEN SATURATION: 95 % | HEIGHT: 66 IN | WEIGHT: 290 LBS | BODY MASS INDEX: 46.61 KG/M2 | HEART RATE: 91 BPM | TEMPERATURE: 98.2 F

## 2024-10-10 DIAGNOSIS — I10 ESSENTIAL (PRIMARY) HYPERTENSION: ICD-10-CM

## 2024-10-10 DIAGNOSIS — I10 PRIMARY HYPERTENSION: ICD-10-CM

## 2024-10-10 DIAGNOSIS — Z00.00 MEDICARE ANNUAL WELLNESS VISIT, SUBSEQUENT: ICD-10-CM

## 2024-10-10 DIAGNOSIS — E78.5 DYSLIPIDEMIA: ICD-10-CM

## 2024-10-10 DIAGNOSIS — Z00.00 MEDICARE ANNUAL WELLNESS VISIT, SUBSEQUENT: Primary | ICD-10-CM

## 2024-10-10 DIAGNOSIS — E11.9 TYPE 2 DIABETES MELLITUS WITHOUT COMPLICATION, WITHOUT LONG-TERM CURRENT USE OF INSULIN (MULTI): ICD-10-CM

## 2024-10-10 DIAGNOSIS — R60.0 BILATERAL LEG EDEMA: ICD-10-CM

## 2024-10-10 DIAGNOSIS — Z00.00 PHYSICAL EXAM: ICD-10-CM

## 2024-10-10 LAB
ALBUMIN SERPL BCP-MCNC: 4.2 G/DL (ref 3.4–5)
ALP SERPL-CCNC: 130 U/L (ref 33–136)
ALT SERPL W P-5'-P-CCNC: 10 U/L (ref 7–45)
ANION GAP SERPL CALC-SCNC: 10 MMOL/L (ref 10–20)
AST SERPL W P-5'-P-CCNC: 13 U/L (ref 9–39)
BILIRUB SERPL-MCNC: 0.6 MG/DL (ref 0–1.2)
BUN SERPL-MCNC: 14 MG/DL (ref 6–23)
CALCIUM SERPL-MCNC: 10.3 MG/DL (ref 8.6–10.6)
CHLORIDE SERPL-SCNC: 103 MMOL/L (ref 98–107)
CHOLEST SERPL-MCNC: 158 MG/DL (ref 0–199)
CHOLESTEROL/HDL RATIO: 2.9
CO2 SERPL-SCNC: 31 MMOL/L (ref 21–32)
CREAT SERPL-MCNC: 0.76 MG/DL (ref 0.5–1.05)
EGFRCR SERPLBLD CKD-EPI 2021: 81 ML/MIN/1.73M*2
ERYTHROCYTE [DISTWIDTH] IN BLOOD BY AUTOMATED COUNT: 14.6 % (ref 11.5–14.5)
EST. AVERAGE GLUCOSE BLD GHB EST-MCNC: 131 MG/DL
GLUCOSE SERPL-MCNC: 123 MG/DL (ref 74–99)
HBA1C MFR BLD: 6.2 %
HCT VFR BLD AUTO: 41 % (ref 36–46)
HDLC SERPL-MCNC: 55.3 MG/DL
HGB BLD-MCNC: 12.7 G/DL (ref 12–16)
LDLC SERPL CALC-MCNC: 80 MG/DL
MCH RBC QN AUTO: 29.2 PG (ref 26–34)
MCHC RBC AUTO-ENTMCNC: 31 G/DL (ref 32–36)
MCV RBC AUTO: 94 FL (ref 80–100)
NON HDL CHOLESTEROL: 103 MG/DL (ref 0–149)
NRBC BLD-RTO: 0 /100 WBCS (ref 0–0)
PLATELET # BLD AUTO: 279 X10*3/UL (ref 150–450)
POTASSIUM SERPL-SCNC: 4.2 MMOL/L (ref 3.5–5.3)
PROT SERPL-MCNC: 6.7 G/DL (ref 6.4–8.2)
RBC # BLD AUTO: 4.35 X10*6/UL (ref 4–5.2)
SODIUM SERPL-SCNC: 140 MMOL/L (ref 136–145)
TRIGL SERPL-MCNC: 115 MG/DL (ref 0–149)
VLDL: 23 MG/DL (ref 0–40)
WBC # BLD AUTO: 4.3 X10*3/UL (ref 4.4–11.3)

## 2024-10-10 PROCEDURE — 83036 HEMOGLOBIN GLYCOSYLATED A1C: CPT

## 2024-10-10 PROCEDURE — 36415 COLL VENOUS BLD VENIPUNCTURE: CPT

## 2024-10-10 PROCEDURE — 85027 COMPLETE CBC AUTOMATED: CPT

## 2024-10-10 PROCEDURE — 1170F FXNL STATUS ASSESSED: CPT | Performed by: INTERNAL MEDICINE

## 2024-10-10 PROCEDURE — 3078F DIAST BP <80 MM HG: CPT | Performed by: INTERNAL MEDICINE

## 2024-10-10 PROCEDURE — 1123F ACP DISCUSS/DSCN MKR DOCD: CPT | Performed by: INTERNAL MEDICINE

## 2024-10-10 PROCEDURE — 3075F SYST BP GE 130 - 139MM HG: CPT | Performed by: INTERNAL MEDICINE

## 2024-10-10 PROCEDURE — G0439 PPPS, SUBSEQ VISIT: HCPCS | Performed by: INTERNAL MEDICINE

## 2024-10-10 PROCEDURE — 80061 LIPID PANEL: CPT

## 2024-10-10 PROCEDURE — 1159F MED LIST DOCD IN RCRD: CPT | Performed by: INTERNAL MEDICINE

## 2024-10-10 PROCEDURE — 1158F ADVNC CARE PLAN TLK DOCD: CPT | Performed by: INTERNAL MEDICINE

## 2024-10-10 PROCEDURE — 99397 PER PM REEVAL EST PAT 65+ YR: CPT | Performed by: INTERNAL MEDICINE

## 2024-10-10 PROCEDURE — 80053 COMPREHEN METABOLIC PANEL: CPT

## 2024-10-10 PROCEDURE — 99213 OFFICE O/P EST LOW 20 MIN: CPT | Performed by: INTERNAL MEDICINE

## 2024-10-10 PROCEDURE — 1036F TOBACCO NON-USER: CPT | Performed by: INTERNAL MEDICINE

## 2024-10-10 RX ORDER — FUROSEMIDE 20 MG/1
20 TABLET ORAL DAILY
Qty: 90 TABLET | Refills: 3 | Status: SHIPPED | OUTPATIENT
Start: 2024-10-10 | End: 2025-10-10

## 2024-10-10 RX ORDER — LISINOPRIL 20 MG/1
20 TABLET ORAL DAILY
Qty: 90 TABLET | Refills: 1 | Status: SHIPPED | OUTPATIENT
Start: 2024-10-10

## 2024-10-10 ASSESSMENT — ACTIVITIES OF DAILY LIVING (ADL)
BATHING: INDEPENDENT
TAKING_MEDICATION: INDEPENDENT
DOING_HOUSEWORK: INDEPENDENT
MANAGING_FINANCES: INDEPENDENT
GROCERY_SHOPPING: INDEPENDENT
DRESSING: INDEPENDENT

## 2024-10-10 ASSESSMENT — PATIENT HEALTH QUESTIONNAIRE - PHQ9
SUM OF ALL RESPONSES TO PHQ9 QUESTIONS 1 AND 2: 0
1. LITTLE INTEREST OR PLEASURE IN DOING THINGS: NOT AT ALL
2. FEELING DOWN, DEPRESSED OR HOPELESS: NOT AT ALL

## 2024-10-10 NOTE — ASSESSMENT & PLAN NOTE
Controlled. Follows low salt diet. Refills sent  Orders:    lisinopril 20 mg tablet; Take 1 tablet (20 mg) by mouth once daily.    CBC; Future    Comprehensive Metabolic Panel; Future    Hemoglobin A1C; Future    Lipid Panel; Future

## 2024-10-10 NOTE — PROGRESS NOTES
"Christina Souza MD  SUBJECTIVE:     Amy Ruby is a 79 y.o. female presenting for her annual physical/wellness.  And routine Follow up. Since hydrochlorothiazide was stopped (for hypercalcemia), her legs have been swollen. Also right arm is swollen too. No pain.  She is right handed  No sob cp  She sees eye doctor regularly.  Sees dermatology, and podiatry regularly  Colon screening: declined cologuard and Colonoscopy   Last pap: na  Last mammogram: Up to date   Vaccines she will get covid and flu vaccine at drug store  Diet:  healthy in general  Exercises: somewhat regularly  Lab Results   Component Value Date    HGBA1C 6.6 (H) 01/11/2024     Lab Results   Component Value Date    CREATININE 0.88 01/11/2024     Lab Results   Component Value Date    WBC 4.6 07/11/2023    HGB 12.1 07/11/2023    HCT 38.9 07/11/2023    MCV 92 07/11/2023     07/11/2023     Lab Results   Component Value Date    CHOL 153 07/11/2023    CHOL 192 09/07/2022    CHOL 195 04/27/2021     Lab Results   Component Value Date    HDL 61.4 07/11/2023    HDL 69.6 09/07/2022    HDL 60.2 04/27/2021     No results found for: \"LDLCALC\"  Lab Results   Component Value Date    TRIG 88 07/11/2023    TRIG 125 09/07/2022    TRIG 176 (H) 04/27/2021     No components found for: \"CHOLHDL\"       ROS:   Feeling well. No dyspnea or chest pain on exertion. No abdominal pain, change in bowel habits, black or bloody stools. No urinary tract or  symptoms.  No breast concerns. No neurological complaints.    OBJECTIVE:   The patient appears well, alert, oriented x 3, in no distress.   /69   Pulse 91   Temp 36.8 °C (98.2 °F)   Ht 1.676 m (5' 6\")   Wt 132 kg (290 lb)   SpO2 95%   BMI 46.81 kg/m²   ENT normal.  Neck supple. No adenopathy or thyromegaly. MARIO ALBERTO. Lungs are clear, good air entry, no wheezes, rhonchi or rales. S1 and S2 normal, no murmurs, regular rate and rhythm. Abdomen is soft without tenderness, guarding, mass or organomegaly.  exam " deferred to Gyn. Right arm showed puffy edema, no pain, induration, both lower legs are showing some edema as well. normal peripheral pulses. Neurological is normal without focal findings.    Assessment & Plan  Medicare annual wellness visit, subsequent    Orders:    CBC; Future    Comprehensive Metabolic Panel; Future    Hemoglobin A1C; Future    Lipid Panel; Future    Essential (primary) hypertension  Controlled. Follows low salt diet. Refills sent  Orders:    lisinopril 20 mg tablet; Take 1 tablet (20 mg) by mouth once daily.    CBC; Future    Comprehensive Metabolic Panel; Future    Hemoglobin A1C; Future    Lipid Panel; Future    Physical exam         Type 2 diabetes mellitus without complication, without long-term current use of insulin (Multi)    Orders:    CBC; Future    Comprehensive Metabolic Panel; Future    Hemoglobin A1C; Future    Lipid Panel; Future    Dyslipidemia         Primary hypertension         Bilateral leg edema  This is since hydrochlorothiazide was stopped. Discussed low salt diet. She does not use much salt. Will add lasix 20mg per day.   Orders:    furosemide (Lasix) 20 mg tablet; Take 1 tablet (20 mg) by mouth once daily.       Medicare Wellness Billing Compliance Satisfied    *This is a visual tool to show completion of required items on the day of the visit. Green checks will only appear on the date of visit.    Review all medications by prescribing practitioner or clinical pharmacist (such as prescriptions, OTCs, herbal therapies and supplements) documented in the medical record    Past Medical, Surgical, and Family History reviewed and updated in chart    Tobacco Use Reviewed    Alcohol Use Reviewed    Illicit Drug Use Reviewed    PHQ2/9    Falls in Last Year Reviewed    Home Safety Risk Factors Reviewed    Cognitive Impairment Reviewed    Patient Self Assessment and Health Status    Current Diet Reviewed    Exercise Frequency    ADL - Hearing Impairment    ADL - Bathing     ADL - Dressing    ADL - Walks in Home    IADL - Managing Finances    IADL - Grocery Shopping    IADL - Taking Medications    IADL - Doing Housework

## 2024-10-15 PROBLEM — E11.65 TYPE 2 DIABETES MELLITUS WITH HYPERGLYCEMIA (MULTI): Status: RESOLVED | Noted: 2023-07-10 | Resolved: 2024-10-15

## 2024-12-02 ENCOUNTER — APPOINTMENT (OUTPATIENT)
Dept: DERMATOLOGY | Facility: HOSPITAL | Age: 79
End: 2024-12-02
Payer: MEDICARE

## 2025-01-24 DIAGNOSIS — E11.9 TYPE 2 DIABETES MELLITUS WITHOUT COMPLICATIONS (MULTI): ICD-10-CM

## 2025-01-24 DIAGNOSIS — E78.5 HYPERLIPIDEMIA, UNSPECIFIED: ICD-10-CM

## 2025-01-26 RX ORDER — ATORVASTATIN CALCIUM 40 MG/1
40 TABLET, FILM COATED ORAL DAILY
Qty: 90 TABLET | Refills: 1 | Status: SHIPPED | OUTPATIENT
Start: 2025-01-26

## 2025-01-26 RX ORDER — METFORMIN HYDROCHLORIDE 500 MG/1
500 TABLET ORAL 2 TIMES DAILY
Qty: 180 TABLET | Refills: 1 | Status: SHIPPED | OUTPATIENT
Start: 2025-01-26

## 2025-01-28 ENCOUNTER — APPOINTMENT (OUTPATIENT)
Dept: DERMATOLOGY | Facility: CLINIC | Age: 80
End: 2025-01-28
Payer: MEDICARE

## 2025-01-28 DIAGNOSIS — D22.9 MULTIPLE BENIGN NEVI: ICD-10-CM

## 2025-01-28 DIAGNOSIS — L57.8 ACTINIC SKIN DAMAGE: ICD-10-CM

## 2025-01-28 DIAGNOSIS — D48.5 NEOPLASM OF UNCERTAIN BEHAVIOR OF SKIN: Primary | ICD-10-CM

## 2025-01-28 DIAGNOSIS — L82.1 SEBORRHEIC KERATOSIS: ICD-10-CM

## 2025-01-28 DIAGNOSIS — L81.4 LENTIGO: ICD-10-CM

## 2025-01-28 DIAGNOSIS — Z12.83 SCREENING EXAM FOR SKIN CANCER: ICD-10-CM

## 2025-01-28 PROCEDURE — 1159F MED LIST DOCD IN RCRD: CPT | Performed by: DERMATOLOGY

## 2025-01-28 PROCEDURE — 99203 OFFICE O/P NEW LOW 30 MIN: CPT | Performed by: DERMATOLOGY

## 2025-01-28 PROCEDURE — 1036F TOBACCO NON-USER: CPT | Performed by: DERMATOLOGY

## 2025-01-28 PROCEDURE — 11102 TANGNTL BX SKIN SINGLE LES: CPT | Performed by: DERMATOLOGY

## 2025-01-28 PROCEDURE — 1123F ACP DISCUSS/DSCN MKR DOCD: CPT | Performed by: DERMATOLOGY

## 2025-01-28 PROCEDURE — 1160F RVW MEDS BY RX/DR IN RCRD: CPT | Performed by: DERMATOLOGY

## 2025-01-28 ASSESSMENT — DERMATOLOGY QUALITY OF LIFE (QOL) ASSESSMENT
ARE THERE EXCLUSIONS OR EXCEPTIONS FOR THE QUALITY OF LIFE ASSESSMENT: NO
RATE HOW BOTHERED YOU ARE BY EFFECTS OF YOUR SKIN PROBLEMS ON YOUR ACTIVITIES (EG, GOING OUT, ACCOMPLISHING WHAT YOU WANT, WORK ACTIVITIES OR YOUR RELATIONSHIPS WITH OTHERS): 0 - NEVER BOTHERED
RATE HOW EMOTIONALLY BOTHERED YOU ARE BY YOUR SKIN PROBLEM (FOR EXAMPLE, WORRY, EMBARRASSMENT, FRUSTRATION): 0 - NEVER BOTHERED
RATE HOW BOTHERED YOU ARE BY SYMPTOMS OF YOUR SKIN PROBLEM (EG, ITCHING, STINGING BURNING, HURTING OR SKIN IRRITATION): 0 - NEVER BOTHERED
DATE THE QUALITY-OF-LIFE ASSESSMENT WAS COMPLETED: 67233

## 2025-01-28 ASSESSMENT — DERMATOLOGY PATIENT ASSESSMENT
HAVE YOU HAD OR DO YOU HAVE VASCULAR DISEASE: NO
DO YOU HAVE IRREGULAR MENSTRUAL CYCLES: NO
ARE YOU ON BIRTH CONTROL: NO
DO YOU HAVE ANY NEW OR CHANGING LESIONS: NO
HAVE YOU HAD OR DO YOU HAVE A STAPH INFECTION: NO
ARE YOU AN ORGAN TRANSPLANT RECIPIENT: NO
ARE YOU TRYING TO GET PREGNANT: NO
DO YOU USE A TANNING BED: NO

## 2025-01-28 ASSESSMENT — ITCH NUMERIC RATING SCALE: HOW SEVERE IS YOUR ITCHING?: 0

## 2025-01-28 ASSESSMENT — PATIENT GLOBAL ASSESSMENT (PGA): PATIENT GLOBAL ASSESSMENT: PATIENT GLOBAL ASSESSMENT:  1 - CLEAR

## 2025-01-28 NOTE — PROGRESS NOTES
Subjective     Amy Ruby is a 80 y.o. female who presents for the following: Skin Check. No prior derm care at .      No personal history of skin cancer or atypical nevi  No FH of melanoma    Today is her first skin exam, her PCP recommended it    Review of Systems:  No other skin or systemic complaints other than what is documented elsewhere in the note.    The following portions of the chart were reviewed this encounter and updated as appropriate:  Tobacco  Allergies  Meds  Problems  Med Hx  Surg Hx         Skin Cancer History  No skin cancer on file.      Specialty Problems          Dermatology Problems    Rosacea    Seborrheic keratosis        Objective   Well appearing patient in no apparent distress; mood and affect are within normal limits.    A full examination was performed including scalp, head, eyes, ears, nose, lips, neck, chest, axillae, abdomen, back, buttocks, bilateral upper extremities, bilateral lower extremities, hands, feet, fingers, toes, fingernails, and toenails. All findings within normal limits unless otherwise noted below.    Assessment/Plan   1. Neoplasm of uncertain behavior of skin  Right Nasal Sidewall  3 mm skin-colored papule with central ulceration and arborizing telangiectasia          Lesion biopsy  Type of biopsy: tangential    Informed consent: discussed and consent obtained    Timeout: patient name, date of birth, surgical site, and procedure verified    Procedure prep:  Patient was prepped and draped  Anesthesia: the lesion was anesthetized in a standard fashion    Anesthetic:  1% lidocaine w/ epinephrine 1-100,000 local infiltration  Instrument used: DermaBlade    Hemostasis achieved with: aluminum chloride    Outcome: patient tolerated procedure well    Post-procedure details: sterile dressing applied and wound care instructions given    Dressing type: petrolatum and bandage      Staff Communication: Dermatology Local Anesthesia: Lidocaine 0.5% with Epinephrine  1;200,000 - Amount: 2 ml    Specimen 1 - Dermatopathology- DERM LAB  Differential Diagnosis: basal cell carcinoma   Check Margins Yes/No?:    Comments:    Dermpath Lab: Routine Histopathology (formalin-fixed tissue)    2. Multiple benign nevi  Brown and tan macules and papules with reassuring findings on dermoscopy    -These lesions have benign, reassuring patterns on dermoscopy  -Recommend continued self observation, and to contact the office if any changes in nevi are noticed    3. Lentigo (2)  Generalized, Right Buccal Cheek  Tan macules    Darkest and broadest on right cheek. Patient reports present >5 years and stable. Photograph for monitoring 1/28/25          -Benign appearing on exam  -Reassurance, recommend observation    4. Seborrheic keratosis  Stuck on, waxy macule(s)/papule(s)/plaque(s) with comedo-like openings and milia like cysts    -Discussed the nature of the diagnosis  -Reassurance, recommend continued observation    5. Actinic skin damage  Background of photodamage with hyper- and hypo-pigmented macules on the skin    6. Screening exam for skin cancer    Full body skin exam  -No lesions concerning for malignancy on the remainder the skin exam today   - The ugly duckling sign was discussed. Monitor for any skin lesions that are different in color, shape, or size than others on body  -Sun protection was discussed. Recommend SPF 30+, hats with brims, sun protective clothing, and avoiding sun exposure between 10 AM and 2 PM whenever possible  -Recommend regular skin exams or sooner if new or changing lesions       Related Procedures  Follow Up In Dermatology - Established Patient        Follow up 6 months spot check for cheek or Full Skin Exam pending path

## 2025-01-30 LAB
LABORATORY COMMENT REPORT: NORMAL
PATH REPORT.FINAL DX SPEC: NORMAL
PATH REPORT.GROSS SPEC: NORMAL
PATH REPORT.MICROSCOPIC SPEC OTHER STN: NORMAL
PATH REPORT.RELEVANT HX SPEC: NORMAL
PATH REPORT.TOTAL CANCER: NORMAL

## 2025-01-31 DIAGNOSIS — C44.91 BASAL CELL CARCINOMA (BCC), UNSPECIFIED SITE: Primary | ICD-10-CM

## 2025-03-14 ENCOUNTER — APPOINTMENT (OUTPATIENT)
Dept: DERMATOLOGY | Facility: CLINIC | Age: 80
End: 2025-03-14
Payer: MEDICARE

## 2025-03-14 VITALS — HEART RATE: 88 BPM | SYSTOLIC BLOOD PRESSURE: 140 MMHG | DIASTOLIC BLOOD PRESSURE: 79 MMHG

## 2025-03-14 DIAGNOSIS — C44.311 BASAL CELL CARCINOMA (BCC) OF SKIN OF NOSE: ICD-10-CM

## 2025-03-14 PROCEDURE — 17312 MOHS ADDL STAGE: CPT | Performed by: DERMATOLOGY

## 2025-03-14 PROCEDURE — 13151 CMPLX RPR E/N/E/L 1.1-2.5 CM: CPT | Performed by: DERMATOLOGY

## 2025-03-14 PROCEDURE — 17311 MOHS 1 STAGE H/N/HF/G: CPT | Performed by: DERMATOLOGY

## 2025-03-14 PROCEDURE — 99214 OFFICE O/P EST MOD 30 MIN: CPT | Performed by: DERMATOLOGY

## 2025-03-14 NOTE — PROGRESS NOTES
Mohs Surgery Operative Note    Date of Surgery:  3/14/2025  Surgeon:  Aylce Dawson MD  Office Location: [address]   [facility] [address]  [facility] [address]  Dept: [phone]  Dept Fax: [phone]  Referring Provider: Elizabeth Garrido MD  [address] [place] [address] Dr Lorelei Kenney, [place] [id number]    Assessment/Plan   Pre-procedure:   Obtained informed consent: written from patient  The surgical site was identified and confirmed with the patient.     Intra-operative:   Audible time out called at : 8:23Am 03/14/25  by: Cally Inman RN   Verified patient name, birthdate, site, specimen bottle label & requisition.    The planned procedure(s) was again reviewed with the patient. The risks of bleeding, infection, nerve damage and scarring were reviewed. Written authorization was obtained. The patient identity, surgical site, and planned procedure(s) were verified. The provider acted as both surgeon and pathologist.     Basal cell carcinoma (BCC) of skin of nose  Right Nasal Sidewall  Mohs surgery  Consent obtained: written    Universal Protocol:  Procedure explained and questions answered to patient or proxy's satisfaction: Yes    Test results available and properly labeled: Yes    Pathology report reviewed: Yes    External notes reviewed: Yes    Photo or diagram used for site identification: Yes    Site/side marked: Yes    Slide independently reviewed by Mohs surgeon: Yes    Immediately prior to procedure a time out was called: Yes    Patient identity confirmed: verbally with patient  Preparation: Patient was prepped and draped in usual sterile fashion      Anticoagulation:  Is the patient taking prescription anticoagulant and/or aspirin prescribed/recommended by a physician? Yes    Was the anticoagulation regimen changed prior to Mohs? No      Anesthesia:  Anesthesia method: local infiltration  Local anesthetic: lidocaine 1% WITH epi    Procedure Details:  Case  ID Number: [id number]  Biopsy accession number: [id number]  Date of biopsy: 1/28/2025  Pre-Op diagnosis: basal cell carcinoma  BCC subtype: nodular  Surgical site (from skin exam): Right Nasal Sidewall  Pre-operative length (cm): 0.5  Pre-operative width (cm): 0.4  Indications for Mohs surgery: anatomic location where tissue conservation is critical    Micrographic Surgery Details:  Post-operative length (cm): 0.5  Post-operative width (cm): 0.5  Number of Mohs stages: 2    Stage 1  Comments: The patient was brought into the operating room and placed in the procedure chair in the appropriate position.  The area positive by previous biopsy was identified and confirmed with the patient. The area of clinically obvious tumor was debulked using a curette and/or scalpel as needed. An incision was made following the Mohs approach through the skin. The specimen was taken to the lab, divided into 2 piece(s) and appropriately chromacoded and processed.  Tumor was seen on the deep margins as indicated on the on the Mohs map.  Nodular basal cell carcinoma. Histologic examination revealed large tumor aggregates of atypical basaloid cells with peripheral palisading and tumoral clefting.   Tumor features identified on Mohs section: basal carcinoma   Depth of invasion: dermis    Stage 2  Comments: The area of positivity as noted on the Mohs map in the previous stage was identified and removed using the Mohs technique. The specimen was taken to the lab and appropriately chromacoded and processed in 1 piece(s).  Tumor features identified on Mohs section: no tumor identified  Depth of defect: subcutaneous fat    Patient tolerance of procedure: tolerated well, no immediate complications    Reconstruction:  Was the defect reconstructed? Yes    Was reconstruction performed by the same Mohs surgeon? Yes    Setting of reconstruction: outpatient office  When was reconstruction performed? same day  Type of reconstruction: linear  Linear  reconstruction: complex  Length of linear repair (cm): 1.2  Subcutaneous Layers (Deep Stitches)   Suture size:  5-0  Suture type:  Monocryl  Stitches:  Buried vertical mattress  Fine/surface layer approximation (top stitches)   Epidermal/Superficial suture size:  5-0  Epidermal/Superficial suture type:  Fast-absorbing gut  Stitches: simple running    Hemostasis achieved with: electrodesiccation  Outcome: patient tolerated procedure well with no complications    Post-procedure details: sterile dressing applied and wound care instructions given    Dressing type: pressure dressing, petrolatum, Hypafix and Telfa pad      Complex Linear Repair - Wide Undermining:  Given the location and size of the defect, it was determined that a complex layered linear closure was required to restore normal anatomy and function. The repair was considered complex because extensive undermining was required and performed. The amount of undermining performed was greater than the maximum width of the defect as measured perpendicular to the closure line along at least one entire edge of the defect. Standing cutaneous cones were removed using Burow's triangles. The wound edges were brought into close approximation with buried vertical mattress sutures. The remainder of the wound was then closed with epidermal top sutures.    The final repair measured 1.2 cm              Wound care was discussed, and the patient was given written post-operative wound care instructions.      The patient will follow up with Alyce Dawson MD as needed for any post operative problems or concerns, and will follow up with their primary dermatologist as scheduled.

## 2025-03-14 NOTE — PROGRESS NOTES
Office Visit Note  Date: 3/14/2025  Surgeon:  Alyce Dawson MD  Office Location: [address]  [facility] [address]  Dept: [phone]  Dept Fax: [phone]  Referring Provider: Elizabeth Garrido MD  [address] [place] [address]   [facility] [address]  [place] [address]    Subjective   Amy Ruby is a 80 y.o. female who presents for the following: MOHS Surgery    According to the patient, the lesion has been present for approximately greater than 1 year at the time of diagnosis.  The lesion is not causing symptoms.  The lesion has not been treated previously.    The patient does not have a pacemaker / defibrillator.  The patient does not have a heart valve / joint replacement.    The patient is on blood thinners.  The patient does not have a history of hepatitis B or C.  The patient does not have a history of HIV.  The patient does not have a history of immunosuppression (e.g. organ transplantation, malignancy, medications)    Review of Systems:  No other skin or systemic complaints other than what is documented elsewhere in the note.    MEDICAL HISTORY: clinically relevant history including significant past medical history, medications and allergies was reviewed and documented in Epic.    Objective   Well appearing patient in no apparent distress; mood and affect are within normal limits.  Vital signs: See record.  Noted on the Right Nasal Sidewall  Is a 0.5 x 0.4 cm scar    The patient confirmed the identified site.    Discussion:  The nature of the diagnosis was explained. The lesion is a skin cancer.  It has a risk of local growth and distant spread. The condition is associated with sun exposure.  Warning signs of non-melanoma skin cancer discussed. Patient was instructed to perform monthly self skin examination.  We recommended that the patient have regular full skin exams given an increased risk of subsequent skin cancers. The patient was  instructed to use sun protective behaviors including use of broad spectrum sunscreens and sun protective clothing to reduce risk of skin cancers.      Risks, benefits, side effects of Mohs surgery were discussed with patient and the patient voiced understanding.  It was explained that even though the cure rate of Mohs is very high it is not 100%. Risks of surgery including but not limited to bleeding, infection, numbness, nerve damage, and scar were reviewed.  Discussion included wound care requirements, activity restrictions, likely scar outcome and time to heal.     After Mohs surgery, the defect may need to be repaired surgically and the scar may be longer than the original lesion.  Reconstruction options, risks, and benefits were reviewed including second intention healing, linear repair (4-1 ratio was explained), local flaps, skin grafts, cartilage grafts and interpolation flaps (the need for multiple surgeries was explained). Possible outcomes were reviewed including likely scar appearance, failure of flap survival, infection, bleeding and the need for revision surgery.     The pathology was reviewed, the photograph was reviewed, and the referring physician's note was reviewed.    Patient elected for Mohs surgery.

## 2025-03-20 ENCOUNTER — APPOINTMENT (OUTPATIENT)
Dept: NEUROLOGY | Facility: CLINIC | Age: 80
End: 2025-03-20
Payer: MEDICARE

## 2025-03-20 VITALS — RESPIRATION RATE: 14 BRPM | HEART RATE: 74 BPM

## 2025-03-20 DIAGNOSIS — G50.0 TRIGEMINAL NEURALGIA: ICD-10-CM

## 2025-03-20 DIAGNOSIS — M79.2 NEURALGIA: Primary | ICD-10-CM

## 2025-03-20 DIAGNOSIS — R25.1 TREMOR: ICD-10-CM

## 2025-03-20 DIAGNOSIS — D32.9 MENINGIOMA (MULTI): ICD-10-CM

## 2025-03-20 PROCEDURE — 99214 OFFICE O/P EST MOD 30 MIN: CPT | Performed by: PSYCHIATRY & NEUROLOGY

## 2025-03-20 PROCEDURE — 1036F TOBACCO NON-USER: CPT | Performed by: PSYCHIATRY & NEUROLOGY

## 2025-03-20 PROCEDURE — 1160F RVW MEDS BY RX/DR IN RCRD: CPT | Performed by: PSYCHIATRY & NEUROLOGY

## 2025-03-20 PROCEDURE — G2211 COMPLEX E/M VISIT ADD ON: HCPCS | Performed by: PSYCHIATRY & NEUROLOGY

## 2025-03-20 PROCEDURE — 1123F ACP DISCUSS/DSCN MKR DOCD: CPT | Performed by: PSYCHIATRY & NEUROLOGY

## 2025-03-20 PROCEDURE — 1159F MED LIST DOCD IN RCRD: CPT | Performed by: PSYCHIATRY & NEUROLOGY

## 2025-03-20 NOTE — PROGRESS NOTES
Subjective     Amy Ruby is a 80 y.o. year old female here for TN / meningioma follow up. - new tremors.     HPI  She has hand tremors , worse on LUE.  Is worse when carrying items.  Eating and drinking is fine.  Daily.   Less facial pain overall.  She is doing well.     She had MOHS procedure on her face for melanoma.   MRI brain 8-31-23 unchanged, similar size of meningioma inferior left tentorium.      current meds:   Gabapentin 900mg 1 tab three times daily.   Lamotrigine 150mg BID   Baclofen 10mg TID       hand tremors are present but not very bothersome.  Carrying food can be difficult.   hx:   starting sept 2018 left temporal HA , sharp, sometimes shocking type pain -- last 2 minutes at a time, clustered in about an hour- started in Sept, went away after about 2 weeks, then came back again in late January - [facility] ER in late January for headaches.- had ESR 27, when laying down the headaches went away. when bending forward would trigger them. that day she had an excruciating sudden headache- which resolved in 1 day.  headaches dissipated. laying down was fine. CT head was done and was normal. has not had HA since January.   no tearing/ no nasal discharge/ no vision change/ no double vision/ no photosensitivity / no phonophobia.  when blinking - she gets a shooting pain near left eye/temporal area- when blowing her nose or putting cream on her face she get elicit a brief shooting pain. sometimes has jaw tenderness, some misalignment of jaw.   tried tylenol - did not help so much.      MRA head /neck unremarkable.   Review of Systems    Patient Active Problem List   Diagnosis    Trigeminal neuralgia    Seborrheic keratosis    Rosacea    Posterior vitreous detachment of left eye    Presbyopia    Postmenopausal bleeding    Positive self-administered antigen test for COVID-19    History of severe acute respiratory syndrome coronavirus 2 (SARS-CoV-2) disease    Perforation of gallbladder    Nuclear senile  cataract    Neuralgia    Obesity, unspecified    Morbid (severe) obesity due to excess calories (CMS/HCC)    Migraines    Migraine headache    Neoplasm of meninges    Meningioma (CMS/HCC)    Injury of kidney    Essential (primary) hypertension    Hypertension    Hypercalcemia    High level of cardiac marker    Fall from or off toilet without subsequent striking against object, subsequent encounter    Polyp of endometrium    Endometrial polyp    Dyslipidemia    Osteoarthritis of knee    Dermatochalasis    Dehydration    Cough    Type 2 diabetes mellitus without complication (CMS/HCC)    Combined form of age-related cataract, both eyes    Calculus of gallbladder with acute cholecystitis without obstruction    Astigmatism    Arteriosclerosis of coronary artery    Allergic rhinitis    Allergic conjunctivitis of both eyes    Derangement of anterior horn of medial meniscus    Abscess of liver    Obstructive sleep apnea syndrome    Gastro-esophageal reflux disease without esophagitis    Hyperlipidemia, unspecified    Muscle weakness (generalized)    Personal history of benign neoplasm of the brain    Rhabdomyolysis    Hypotension    Long term (current) use of oral hypoglycemic drugs    Acute kidney failure, unspecified (CMS/HCC)    Other fecal abnormalities    Acquired absence of other specified parts of digestive tract    Type 2 diabetes mellitus with hyperglycemia (CMS/HCC)     Past Medical History:   Diagnosis Date    Age-related nuclear cataract, left eye 01/20/2020    Age-related nuclear cataract, left    Age-related nuclear cataract, right eye 01/20/2020    Age-related nuclear cataract, right    Cholecystitis, acute 07/10/2023    Cortical age-related cataract, left eye 01/20/2020    Cortical age-related cataract of left eye    Cortical age-related cataract, right eye 01/20/2020    Cortical age-related cataract of right eye    Encounter for general adult medical examination without abnormal findings 09/07/2022     Medicare annual wellness visit, subsequent    Pain in right knee 11/12/2015    Knee pain, bilateral    Perforation of gallbladder 01/03/2023    Personal history of other diseases of the circulatory system     History of essential hypertension    Personal history of other diseases of the circulatory system 03/06/2017    History of abnormal electrocardiography    Personal history of other diseases of the respiratory system     History of bronchitis    Personal history of other endocrine, nutritional and metabolic disease     History of hyperlipidemia    Personal history of other endocrine, nutritional and metabolic disease     History of diabetes mellitus     Past Surgical History:   Procedure Laterality Date    CHOLECYSTECTOMY      KNEE SURGERY  02/16/2017    Knee Surgery Right    MR HEAD ANGIO WO IV CONTRAST  03/07/2019    MR HEAD ANGIO WO IV CONTRAST 3/7/2019 [facility]CILLARY LEGACY    MR NECK ANGIO WO IV CONTRAST  03/07/2019    [place] NECK ANGIO WO IV CONTRAST [place] [facility] [place] [place]    OTHER SURGICAL HISTORY  03/06/2017    Arterial Catheterization    OTHER SURGICAL HISTORY  05/08/2017    Hysteroscopy    TONSILLECTOMY  02/16/2017    Tonsillectomy     Social History     Tobacco Use    Smoking status: Never    Smokeless tobacco: Never   Substance Use Topics    Alcohol use: Never     family history includes Cardiac Disorder in her mother; Dementia in her mother; Diabetes in her brother, father, and sister; Hypertension in her brother, mother, and sister; Prostate cancer in her father; Seizures in her father.    Current Outpatient Medications:     aspirin 81 mg EC tablet, Take by mouth once daily., Disp: , Rfl:     atorvastatin (Lipitor) 40 mg tablet, TAKE 1 TABLET BY MOUTH EVERY DAY, Disp: 90 tablet, Rfl: 1    baclofen (Lioresal) 10 mg tablet, TAKE 1 TABLET BY MOUTH THREE TIMES DAILY, Disp: 270 tablet, Rfl: 3    gabapentin (Neurontin) 300 mg capsule, Take 1 capsule (300 mg) by mouth 3 times a day. TITRATE AS  "DIRECTED., Disp: 270 capsule, Rfl: 3    gabapentin (Neurontin) 600 mg tablet, Take 1 tablet (600 mg) by mouth 3 times a day., Disp: 270 tablet, Rfl: 3    lamoTRIgine (LaMICtal) 100 mg tablet, Take 1 tablet (100 mg) by mouth 2 times a day., Disp: 180 tablet, Rfl: 3    lisinopril 20 mg tablet, Take 1 tablet (20 mg) by mouth once daily., Disp: 90 tablet, Rfl: 1    metFORMIN (Glucophage) 500 mg tablet, TAKE 1 TABLET BY MOUTH TWICE A DAY, Disp: 180 tablet, Rfl: 1  Allergies   Allergen Reactions    Other Other      Pulse 74   Temp 36.3 °C (97.3 °F)   Resp 18   Ht 1.689 m (5' 6.5\")   Wt 131 kg (289 lb 12.8 oz)   BMI 46.07 kg/m²   Neurological Exam/Physical Exam:    Constitutional: General appearance: no acute distress. Pleasant.   Auscultation of Heart: Regular rate and rhythm, no murmurs, normal S1 and S2.   Carotid Arteries: no bruits  Peripheral Vascular Exam: No swelling, edema or tenderness to palpation in extremities  Mental status: no distress, alert, interactive and cooperative. Affect is appropriate.   Orientation: oriented to person, oriented to place and oriented to time.   Memory: recent memory intact and remote memory intact.   Attention: normal attention /concentration.   Language: normal comprehension and naming.   Fund of knowledge: Patient displays adequate knowledge of current events.  Eyes: The ophthalmoscopic examination was normal.   Cranial nerve II: Visual fields full to confrontation.   Cranial nerves III, IV, and VI: Pupils round, equally reactive to light; EOMs intact. No nystagmus.   Cranial Nerve V: dysesthesia on L cheek, V2 mostly and v1. LT and PP are WNL b/l.   Cranial nerve VII: no facial droop  Cranial nerve VIII: Hearing is intact  Cranial nerves IX and X: Palate elevates symmetrically.   Cranial nerve XI: Shoulder shrug intact.   Cranial nerve XII: Tongue is midline.  Motor:  Muscle bulk was normal in both upper and lower extremities.    No atrophy.   Strength is normal.   Deep " Tendon Reflexes: left biceps 2+ , right biceps 2+, left triceps 2+, right triceps 2+, left brachioradialis 2+, right brachioradialis 2+, left patella 2+, right patella 2+, left ankle jerk 2+, right ankle jerk 2+   Plantar Reflex: Toes downgoing to plantar stimulation on the left. Toes downgoing to plantar stimulation on the right.   Sensory Exam: Normal to vibratory sensation  Coordination:  no limb dystaxia and rapid alternating movements are intact.   Gait:  cautious.         Labs:  CBC:   Lab Results   Component Value Date    WBC 4.6 07/11/2023    HGB 12.1 07/11/2023    HCT 38.9 07/11/2023     07/11/2023     BMP:   Lab Results   Component Value Date     01/11/2024    K 4.4 01/11/2024    CL 98 01/11/2024    CO2 30 01/11/2024    BUN 18 01/11/2024    CREATININE 0.88 01/11/2024    CALCIUM 10.7 (H) 02/08/2024    MG 1.40 (L) 01/03/2023    PHOS 3.0 02/08/2024     LFT:   Lab Results   Component Value Date    ALKPHOS 98 07/11/2023    BILITOT 0.5 07/11/2023    BILIDIR 0.1 11/04/2019    PROT 6.8 07/11/2023    ALBUMIN 4.6 07/11/2023    ALT 13 07/11/2023    AST 15 07/11/2023       Labwork 10-24. LFT are normal. LDL 80.   Electrolytes WNL.  Renal function WNL   WBC 4.3  Assessment/Plan   Problem List Items Addressed This Visit             ICD-10-CM    Neuralgia M79.2    Meningioma (CMS/HCC) - Primary D32.9       neuralgia pain secondary to her growing meningioma.     She wants to reduce her meds for the neuralgia-  start with baclofen to reduce and see how she does.   Tremor - observe.

## 2025-03-20 NOTE — PATIENT INSTRUCTIONS
"It was a pleasure seeing you today.     Reduce baclofen 10mg twice daily ( morning and evening) x 1 week then 1 tab at night x 7 days and consider stopping it.     Please make a follow up appointment in 6 months.  You may also schedule a phone or virtual visit sooner on a Friday morning with me as needed before the next clinic appointment.     For any urgent issues or needing to speak to a medical assistant please call [phone], option 6 during our office hours Monday-Friday 8am-4pm, and leave a voicemail with your concern.  My office will try to reach back you as soon as possible within 24 (business) hours.  If you have an emergency please call 911 or visit a local urgent care or nearest emergency room.      Please understand that [employer] is a useful communication tool for simple \"normal\" results or a refill request but I would not recommend using this tool for emergent or urgent issues or for conversations with me.  I am happy to ask my staff to rearrange a follow up visit or a virtual visit sooner than requested if appropriate for your care.    "

## 2025-04-14 ENCOUNTER — APPOINTMENT (OUTPATIENT)
Dept: PRIMARY CARE | Facility: CLINIC | Age: 80
End: 2025-04-14
Payer: MEDICARE

## 2025-04-14 VITALS
SYSTOLIC BLOOD PRESSURE: 132 MMHG | HEART RATE: 86 BPM | BODY MASS INDEX: 45.48 KG/M2 | DIASTOLIC BLOOD PRESSURE: 67 MMHG | OXYGEN SATURATION: 94 % | WEIGHT: 283 LBS | HEIGHT: 66 IN | TEMPERATURE: 98 F

## 2025-04-14 DIAGNOSIS — R41.3 MEMORY DEFICIT: ICD-10-CM

## 2025-04-14 DIAGNOSIS — I10 ESSENTIAL (PRIMARY) HYPERTENSION: ICD-10-CM

## 2025-04-14 DIAGNOSIS — E11.9 TYPE 2 DIABETES MELLITUS WITHOUT COMPLICATION, WITHOUT LONG-TERM CURRENT USE OF INSULIN: Primary | ICD-10-CM

## 2025-04-14 DIAGNOSIS — E55.9 VITAMIN D DEFICIENCY: ICD-10-CM

## 2025-04-14 DIAGNOSIS — E66.01 MORBID (SEVERE) OBESITY DUE TO EXCESS CALORIES (MULTI): ICD-10-CM
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
## 2025-04-14 PROBLEM — N17.9 ACUTE KIDNEY INJURY: Status: ACTIVE | Noted: 2023-01-03

## 2025-04-14 PROBLEM — D33.2 BENIGN NEOPLASM OF BRAIN (MULTI): Status: ACTIVE | Noted: 2023-01-03

## 2025-04-14 PROCEDURE — 1036F TOBACCO NON-USER: CPT | Performed by: INTERNAL MEDICINE

## 2025-04-14 PROCEDURE — 1159F MED LIST DOCD IN RCRD: CPT | Performed by: INTERNAL MEDICINE

## 2025-04-14 PROCEDURE — 3075F SYST BP GE 130 - 139MM HG: CPT | Performed by: INTERNAL MEDICINE

## 2025-04-14 PROCEDURE — 99214 OFFICE O/P EST MOD 30 MIN: CPT | Performed by: INTERNAL MEDICINE

## 2025-04-14 PROCEDURE — 1123F ACP DISCUSS/DSCN MKR DOCD: CPT | Performed by: INTERNAL MEDICINE

## 2025-04-14 PROCEDURE — 1158F ADVNC CARE PLAN TLK DOCD: CPT | Performed by: INTERNAL MEDICINE

## 2025-04-14 PROCEDURE — 3078F DIAST BP <80 MM HG: CPT | Performed by: INTERNAL MEDICINE

## 2025-04-14 RX ORDER — LISINOPRIL 20 MG/1
20 TABLET ORAL DAILY
Qty: 90 TABLET | Refills: 1 | Status: SHIPPED | OUTPATIENT
Start: 2025-04-14

## 2025-04-14 NOTE — ASSESSMENT & PLAN NOTE
Orders:    lisinopril 20 mg tablet; Take 1 tablet (20 mg) by mouth once daily.    Basic Metabolic Panel; Future

## 2025-04-14 NOTE — PROGRESS NOTES
"PCP: Christina Souza MD   I have reviewed existing histories, notes, past medical history, surgical history, social history, family history, med list, allergy list, and immunization, and updated.    HPI:   Routine Follow up   Doing well. Leg edema is better/less since lasix added.  No cp sob.  She does think that her memory is not as good as before, mostly just recalling names. They do come back to her when she takes sometime to think. Not forgetting to turn off stove, no trouble with driving.    Lab Results   Component Value Date    WBC 4.3 (L) 10/10/2024    HGB 12.7 10/10/2024    HCT 41.0 10/10/2024     10/10/2024    CHOL 158 10/10/2024    TRIG 115 10/10/2024    HDL 55.3 10/10/2024    ALT 10 10/10/2024    AST 13 10/10/2024     10/10/2024    K 4.2 10/10/2024     10/10/2024    CREATININE 0.76 10/10/2024    BUN 14 10/10/2024    CO2 31 10/10/2024    INR 1.0 01/27/2023    HGBA1C 6.2 (H) 10/10/2024     Physical exam:  /67   Pulse 86   Temp 36.7 °C (98 °F)   Ht 1.676 m (5' 6\")   Wt 128 kg (283 lb)   SpO2 94%   BMI 45.68 kg/m²     Neck exam showed no mass, lymphadenopathy, or thyroid enlargement, and no carotid bruit.  Heart RR  Lungs clear  No Abdominal pain  Trace leg edema      Assessments/orders:   Assessment & Plan  Type 2 diabetes mellitus without complication, without long-term current use of insulin    Orders:    Hemoglobin A1C; Future    Basic Metabolic Panel; Future    Lipid Panel; Future    Albumin-Creatinine Ratio, Urine Random; Future    Essential (primary) hypertension    Orders:    lisinopril 20 mg tablet; Take 1 tablet (20 mg) by mouth once daily.    Basic Metabolic Panel; Future    Vitamin D deficiency    Orders:    Vitamin D 25-Hydroxy,Total (for eval of Vitamin D levels); Future    Morbid (severe) obesity due to excess calories (Multi)         Memory deficit    Orders:    TSH with reflex to Free T4 if abnormal; Future    Vitamin B12; Future    Syphilis Screen with Reflex; " What Type Of Note Output Would You Prefer (Optional)?: Standard Output Future    I asked pt to get RSV vaccine from her pharmacy                  How Severe Is Your Acne?: moderate Is This A New Presentation, Or A Follow-Up?: Acne

## 2025-04-17 LAB
25(OH)D3+25(OH)D2 SERPL-MCNC: 10 NG/ML (ref 30–100)
ALBUMIN/CREAT UR: 4 MG/G CREAT
ANION GAP SERPL CALCULATED.4IONS-SCNC: 8 MMOL/L (CALC) (ref 7–17)
BUN SERPL-MCNC: 15 MG/DL (ref 7–25)
BUN/CREAT SERPL: ABNORMAL (CALC) (ref 6–22)
CALCIUM SERPL-MCNC: 10.6 MG/DL (ref 8.6–10.4)
CHLORIDE SERPL-SCNC: 102 MMOL/L (ref 98–110)
CHOLEST SERPL-MCNC: 160 MG/DL
CHOLEST/HDLC SERPL: 2.5 (CALC)
CO2 SERPL-SCNC: 28 MMOL/L (ref 20–32)
CREAT SERPL-MCNC: 0.83 MG/DL (ref 0.6–1)
CREAT UR-MCNC: 167 MG/DL (ref 20–275)
EGFRCR SERPLBLD CKD-EPI 2021: 72 ML/MIN/1.73M2
EST. AVERAGE GLUCOSE BLD GHB EST-MCNC: 137 MG/DL
EST. AVERAGE GLUCOSE BLD GHB EST-SCNC: 7.6 MMOL/L
GLUCOSE SERPL-MCNC: 124 MG/DL (ref 65–99)
HBA1C MFR BLD: 6.4 %
HDLC SERPL-MCNC: 63 MG/DL
LDLC SERPL CALC-MCNC: 75 MG/DL (CALC)
MICROALBUMIN UR-MCNC: 0.6 MG/DL
NONHDLC SERPL-MCNC: 97 MG/DL (CALC)
POTASSIUM SERPL-SCNC: 4.5 MMOL/L (ref 3.5–5.3)
SODIUM SERPL-SCNC: 138 MMOL/L (ref 135–146)
T PALLIDUM AB SER QL IA: NEGATIVE
TRIGL SERPL-MCNC: 139 MG/DL
TSH SERPL-ACNC: 1.76 MIU/L (ref 0.4–4.5)
VIT B12 SERPL-MCNC: 252 PG/ML (ref 200–1100)

## 2025-05-17 DIAGNOSIS — E11.9 TYPE 2 DIABETES MELLITUS WITHOUT COMPLICATIONS: ICD-10-CM

## 2025-05-18 RX ORDER — METFORMIN HYDROCHLORIDE 500 MG/1
500 TABLET ORAL 2 TIMES DAILY
Qty: 180 TABLET | Refills: 1 | Status: SHIPPED | OUTPATIENT
Start: 2025-05-18

## 2025-07-03 ENCOUNTER — TELEPHONE (OUTPATIENT)
Facility: CLINIC | Age: 80
End: 2025-07-03
Payer: MEDICARE

## 2025-07-03 NOTE — TELEPHONE ENCOUNTER
Patients  came into the time share to make you aware that his wife forgot to take her gabapentin one day and ever since she has been having issues, she though it was her teeth however she now feels it isnt and holds her face constantly as she is in pain. Is there any sooner appointment where she can see you or would a virtual be ok?

## 2025-07-12 DIAGNOSIS — E78.5 HYPERLIPIDEMIA, UNSPECIFIED: ICD-10-CM

## 2025-07-14 RX ORDER — ATORVASTATIN CALCIUM 40 MG/1
40 TABLET, FILM COATED ORAL DAILY
Qty: 90 TABLET | Refills: 2 | Status: SHIPPED | OUTPATIENT
Start: 2025-07-14

## 2025-07-20 ASSESSMENT — CUP TO DISC RATIO
OS_RATIO: .4
OD_RATIO: .4

## 2025-07-20 ASSESSMENT — EXTERNAL EXAM - LEFT EYE: OS_EXAM: NORMAL

## 2025-07-20 ASSESSMENT — EXTERNAL EXAM - RIGHT EYE: OD_EXAM: NORMAL

## 2025-07-20 NOTE — PROGRESS NOTES
[id number]  -Color plates (7/21/25) - 10/10 OU  -Dx after imaging due to shooting pain/headaches. No diplopia or change in vision. S/p gamma knife with rad/onc November 2020.   -OCT RNFL (7/21/25) - SS: 8/10 OD 8/10 OS. OD: WNL. OS: WNL. 91/82. Similar to 7/30/21.   -HVF 24-2 (7/21/25) - OD: 2/14FL 15%FP 4%FN. Scattered, but WNL. OS: 4/13FL 9%FP. WNL. Similar to 7/10/23.   -MRI brain (9/8/22) - Dural-based mass extending from the inferior left tentorium into the posterior aspect of the left [place] is similar to in size and appearance to the prior exam. No anterior extension into the foramen rotundum or foramen ovale is noted. The white matter has a few punctate hyperintensities not unusual for patient`s age which may be secondary to hypertension or migraine among others, unchanged.   -MRI brain w/wo contrast (8/31/23) - 1. Unchanged meningioma arising from the inferior left tentorium and extending into the posterior aspect of left [place]. 2. No evidence of acute infarct, intracranial mass effect or midline   shift.   -Followed by neurosurgery and neurology.   -F/u 1 year and recheck refraction, glare/BAT, dilation, color plates, HVF 24-2, OCT RNFL. Plan for Lenstar/Pentacam/OCT macula only if cataract(s) visually significant.    Controlled diabetes mellitus type II without [id number]  -HbA1c= 6.4 (4/14/25). No diabetic retinopathy seen on last dilated exam. Continue close monitoring of blood glucose, blood pressure, and cholesterol. Plan for annual dilated eye exam.    Combined form of age-related cataract, right eyeH25.811  Combined form of age-related cataract, left eyeH25.812  -Mildly visually significant, but no impact on ADL`s. Monitor for now. F/u 12 months to re-evaluate -- recheck refraction, glare/BAT, dilation, color plates, HVF 24-2, OCT RNFL. Plan for Lenstar/Pentacam/OCT macula only if cataract(s) visually significant.    PVD (posterior vitreous detachment), left  eyeH43.812  -No retinal tear or detachment seen on exam. Retinal detachment symptoms discussed at previous visit.    Allergic conjunctivitis of both eyesH10.13  -Patient with seasonal allergies (spring/fall) and itchy eyes  -Recommend start with artificial tears PRN, can also try cool compresses for severe itching  -May also try OTC allergy gtts (Zaditor, Ketotifen, Alaway) or oral antihistamine.     [id number]  -Patient noticing worsening of dermatochalasis RUL>[place]. May refer to oculoplastic surgeon as needed. Patient wishes to defer for now.     [id number]  GhdrgfrgkqB28.4  -Rx given 2023, did not fill  -May continue OTC reading glasses as needed (Currently using +1.50).   -Refraction performed today for diagnostic purposes only and without specific intent to dispense Rx. Glasses Rx not dispensed today.       No history of intraocular surgery/refractive surgery.   No FH of AMD/glaucoma

## 2025-07-21 ENCOUNTER — APPOINTMENT (OUTPATIENT)
Dept: OPHTHALMOLOGY | Facility: CLINIC | Age: 80
End: 2025-07-21
Payer: MEDICARE

## 2025-07-21 DIAGNOSIS — H10.13 ALLERGIC CONJUNCTIVITIS OF BOTH EYES: ICD-10-CM

## 2025-07-21 DIAGNOSIS — H25.811 COMBINED FORM OF AGE-RELATED CATARACT, RIGHT EYE: ICD-10-CM

## 2025-07-21 DIAGNOSIS — H25.812 COMBINED FORM OF AGE-RELATED CATARACT, LEFT EYE: ICD-10-CM

## 2025-07-21 DIAGNOSIS — H02.834 DERMATOCHALASIS OF BOTH UPPER EYELIDS: ICD-10-CM

## 2025-07-21 DIAGNOSIS — H52.4 PRESBYOPIA: ICD-10-CM

## 2025-07-21 DIAGNOSIS — D32.9 MENINGIOMA (MULTI): Primary | ICD-10-CM

## 2025-07-21 DIAGNOSIS — H02.831 DERMATOCHALASIS OF BOTH UPPER EYELIDS: ICD-10-CM

## 2025-07-21 DIAGNOSIS — H43.812 PVD (POSTERIOR VITREOUS DETACHMENT), LEFT EYE: ICD-10-CM

## 2025-07-21 DIAGNOSIS — E11.9 DIABETES MELLITUS WITHOUT COMPLICATION: ICD-10-CM

## 2025-07-21 DIAGNOSIS — H52.202 ASTIGMATISM OF LEFT EYE, UNSPECIFIED TYPE: ICD-10-CM

## 2025-07-21 PROCEDURE — 99214 OFFICE O/P EST MOD 30 MIN: CPT | Performed by: OPHTHALMOLOGY

## 2025-07-21 PROCEDURE — 92083 EXTENDED VISUAL FIELD XM: CPT | Performed by: OPHTHALMOLOGY

## 2025-07-21 PROCEDURE — 92133 CPTRZD OPH DX IMG PST SGM ON: CPT | Performed by: OPHTHALMOLOGY

## 2025-07-21 ASSESSMENT — TONOMETRY
OD_IOP_MMHG: 18
OS_IOP_MMHG: 18
IOP_METHOD: GOLDMANN APPLANATION

## 2025-07-21 ASSESSMENT — CONF VISUAL FIELD
OD_NORMAL: 1
OS_SUPERIOR_NASAL_RESTRICTION: 0
OD_SUPERIOR_NASAL_RESTRICTION: 0
OD_INFERIOR_TEMPORAL_RESTRICTION: 0
OS_NORMAL: 1
OS_INFERIOR_NASAL_RESTRICTION: 0
OD_INFERIOR_NASAL_RESTRICTION: 0
OS_SUPERIOR_TEMPORAL_RESTRICTION: 0
OS_INFERIOR_TEMPORAL_RESTRICTION: 0
OD_SUPERIOR_TEMPORAL_RESTRICTION: 0

## 2025-07-21 ASSESSMENT — ENCOUNTER SYMPTOMS
NEUROLOGICAL NEGATIVE: 0
ENDOCRINE NEGATIVE: 0
CARDIOVASCULAR NEGATIVE: 0
RESPIRATORY NEGATIVE: 0
MUSCULOSKELETAL NEGATIVE: 0
CONSTITUTIONAL NEGATIVE: 0
GASTROINTESTINAL NEGATIVE: 0
EYES NEGATIVE: 1
PSYCHIATRIC NEGATIVE: 0
ALLERGIC/IMMUNOLOGIC NEGATIVE: 0
HEMATOLOGIC/LYMPHATIC NEGATIVE: 0

## 2025-07-21 ASSESSMENT — REFRACTION_MANIFEST
OS_CYLINDER: -0.50
OD_CYLINDER: SPHERE
OS_AXIS: 075
OS_SPHERE: PLANO
OD_ADD: +2.50
OS_ADD: +2.50
OD_SPHERE: PLANO

## 2025-07-21 ASSESSMENT — VISUAL ACUITY
OD_BAT_MED: 20/40
METHOD: SNELLEN - LINEAR
OS_SC: 20/20
OS_BAT_MED: 20/60
OD_SC: 20/25+2

## 2025-08-12 ENCOUNTER — APPOINTMENT (OUTPATIENT)
Dept: DERMATOLOGY | Facility: CLINIC | Age: 80
End: 2025-08-12
Payer: MEDICARE

## 2025-08-12 DIAGNOSIS — D22.9 FIBROUS PAPULE OF SKIN: ICD-10-CM

## 2025-08-12 DIAGNOSIS — Z12.83 SCREENING EXAM FOR SKIN CANCER: ICD-10-CM

## 2025-08-12 DIAGNOSIS — I87.2 VENOUS STASIS DERMATITIS OF BOTH LOWER EXTREMITIES: ICD-10-CM

## 2025-08-12 DIAGNOSIS — G50.0 TRIGEMINAL NEURALGIA: ICD-10-CM

## 2025-08-12 DIAGNOSIS — Z85.828 PERSONAL HISTORY OF SKIN CANCER: ICD-10-CM

## 2025-08-12 DIAGNOSIS — L82.1 SEBORRHEIC KERATOSIS: ICD-10-CM

## 2025-08-12 DIAGNOSIS — D22.9 MULTIPLE BENIGN NEVI: Primary | ICD-10-CM

## 2025-08-12 DIAGNOSIS — L81.4 LENTIGO: ICD-10-CM

## 2025-08-12 DIAGNOSIS — L57.8 ACTINIC SKIN DAMAGE: ICD-10-CM

## 2025-08-12 PROCEDURE — 1159F MED LIST DOCD IN RCRD: CPT | Performed by: DERMATOLOGY

## 2025-08-12 PROCEDURE — 1160F RVW MEDS BY RX/DR IN RCRD: CPT | Performed by: DERMATOLOGY

## 2025-08-12 PROCEDURE — 99213 OFFICE O/P EST LOW 20 MIN: CPT | Performed by: DERMATOLOGY

## 2025-08-12 PROCEDURE — 1036F TOBACCO NON-USER: CPT | Performed by: DERMATOLOGY

## 2025-08-12 RX ORDER — LAMOTRIGINE 150 MG/1
TABLET ORAL
Qty: 180 TABLET | Refills: 3 | Status: SHIPPED | OUTPATIENT
Start: 2025-08-12

## 2025-08-12 ASSESSMENT — DERMATOLOGY PATIENT ASSESSMENT
DO YOU HAVE ANY NEW OR CHANGING LESIONS: NO
HAVE YOU HAD OR DO YOU HAVE VASCULAR DISEASE: NO
ARE YOU AN ORGAN TRANSPLANT RECIPIENT: NO
ARE YOU ON BIRTH CONTROL: NO
DO YOU HAVE IRREGULAR MENSTRUAL CYCLES: NO
HAVE YOU HAD OR DO YOU HAVE A STAPH INFECTION: NO
DO YOU USE SUNSCREEN: DAILY
ARE YOU TRYING TO GET PREGNANT: NO
DO YOU USE A TANNING BED: NO

## 2025-08-12 ASSESSMENT — ITCH NUMERIC RATING SCALE: HOW SEVERE IS YOUR ITCHING?: 0

## 2025-08-12 ASSESSMENT — DERMATOLOGY QUALITY OF LIFE (QOL) ASSESSMENT
RATE HOW BOTHERED YOU ARE BY SYMPTOMS OF YOUR SKIN PROBLEM (EG, ITCHING, STINGING BURNING, HURTING OR SKIN IRRITATION): 0 - NEVER BOTHERED
DATE THE QUALITY-OF-LIFE ASSESSMENT WAS COMPLETED: 67429
RATE HOW EMOTIONALLY BOTHERED YOU ARE BY YOUR SKIN PROBLEM (FOR EXAMPLE, WORRY, EMBARRASSMENT, FRUSTRATION): 0 - NEVER BOTHERED
RATE HOW BOTHERED YOU ARE BY EFFECTS OF YOUR SKIN PROBLEMS ON YOUR ACTIVITIES (EG, GOING OUT, ACCOMPLISHING WHAT YOU WANT, WORK ACTIVITIES OR YOUR RELATIONSHIPS WITH OTHERS): 0 - NEVER BOTHERED
ARE THERE EXCLUSIONS OR EXCEPTIONS FOR THE QUALITY OF LIFE ASSESSMENT: NO

## 2025-08-12 ASSESSMENT — PATIENT GLOBAL ASSESSMENT (PGA): PATIENT GLOBAL ASSESSMENT: PATIENT GLOBAL ASSESSMENT:  1 - CLEAR

## 2025-08-21 ENCOUNTER — APPOINTMENT (OUTPATIENT)
Facility: CLINIC | Age: 80
End: 2025-08-21
Payer: MEDICARE

## 2025-08-21 VITALS
TEMPERATURE: 97.3 F | WEIGHT: 281 LBS | DIASTOLIC BLOOD PRESSURE: 71 MMHG | HEART RATE: 89 BPM | HEIGHT: 66 IN | SYSTOLIC BLOOD PRESSURE: 118 MMHG | BODY MASS INDEX: 45.16 KG/M2

## 2025-08-21 DIAGNOSIS — D32.9 MENINGIOMA (MULTI): Primary | ICD-10-CM

## 2025-08-21 DIAGNOSIS — M79.2 NEURALGIA: ICD-10-CM

## 2025-08-21 DIAGNOSIS — E53.8 B12 DEFICIENCY: ICD-10-CM

## 2025-08-21 PROCEDURE — 1160F RVW MEDS BY RX/DR IN RCRD: CPT | Performed by: PSYCHIATRY & NEUROLOGY

## 2025-08-21 PROCEDURE — 3074F SYST BP LT 130 MM HG: CPT | Performed by: PSYCHIATRY & NEUROLOGY

## 2025-08-21 PROCEDURE — G8433 SCR FOR DEP NOT CPT DOC RSN: HCPCS | Performed by: PSYCHIATRY & NEUROLOGY

## 2025-08-21 PROCEDURE — 3078F DIAST BP <80 MM HG: CPT | Performed by: PSYCHIATRY & NEUROLOGY

## 2025-08-21 PROCEDURE — 1125F AMNT PAIN NOTED PAIN PRSNT: CPT | Performed by: PSYCHIATRY & NEUROLOGY

## 2025-08-21 PROCEDURE — 99214 OFFICE O/P EST MOD 30 MIN: CPT | Performed by: PSYCHIATRY & NEUROLOGY

## 2025-08-21 PROCEDURE — 1036F TOBACCO NON-USER: CPT | Performed by: PSYCHIATRY & NEUROLOGY

## 2025-08-21 PROCEDURE — G2211 COMPLEX E/M VISIT ADD ON: HCPCS | Performed by: PSYCHIATRY & NEUROLOGY

## 2025-08-21 PROCEDURE — 1159F MED LIST DOCD IN RCRD: CPT | Performed by: PSYCHIATRY & NEUROLOGY

## 2025-08-21 ASSESSMENT — ENCOUNTER SYMPTOMS
OCCASIONAL FEELINGS OF UNSTEADINESS: 1
DEPRESSION: 0
LOSS OF SENSATION IN FEET: 0

## 2025-08-21 ASSESSMENT — PAIN SCALES - GENERAL: PAINLEVEL_OUTOF10: 10-WORST PAIN EVER

## 2025-09-04 ENCOUNTER — HOSPITAL ENCOUNTER (OUTPATIENT)
Dept: RADIOLOGY | Facility: CLINIC | Age: 80
Discharge: HOME | End: 2025-09-04
Payer: MEDICARE

## 2025-09-04 DIAGNOSIS — M79.2 NEURALGIA: ICD-10-CM

## 2025-09-04 DIAGNOSIS — D32.9 MENINGIOMA (MULTI): ICD-10-CM

## 2025-09-04 PROCEDURE — 70551 MRI BRAIN STEM W/O DYE: CPT

## 2025-09-04 PROCEDURE — 70551 MRI BRAIN STEM W/O DYE: CPT | Performed by: RADIOLOGY

## 2025-09-29 ENCOUNTER — APPOINTMENT (OUTPATIENT)
Facility: CLINIC | Age: 80
End: 2025-09-29
Payer: MEDICARE

## 2025-10-21 ENCOUNTER — APPOINTMENT (OUTPATIENT)
Dept: PRIMARY CARE | Facility: CLINIC | Age: 80
End: 2025-10-21
Payer: MEDICARE

## 2026-02-04 ENCOUNTER — APPOINTMENT (OUTPATIENT)
Dept: DERMATOLOGY | Facility: CLINIC | Age: 81
End: 2026-02-04
Payer: MEDICARE

## 2026-07-27 ENCOUNTER — APPOINTMENT (OUTPATIENT)
Dept: OPHTHALMOLOGY | Facility: CLINIC | Age: 81
End: 2026-07-27
Payer: MEDICARE